# Patient Record
Sex: FEMALE | NOT HISPANIC OR LATINO | Employment: OTHER | URBAN - METROPOLITAN AREA
[De-identification: names, ages, dates, MRNs, and addresses within clinical notes are randomized per-mention and may not be internally consistent; named-entity substitution may affect disease eponyms.]

---

## 2017-01-26 ENCOUNTER — TELEPHONE (OUTPATIENT)
Dept: BONE DENSITY | Facility: CLINIC | Age: 67
End: 2017-01-26

## 2017-03-03 ENCOUNTER — TELEPHONE (OUTPATIENT)
Dept: FAMILY MEDICINE | Facility: CLINIC | Age: 67
End: 2017-03-03

## 2017-03-03 NOTE — TELEPHONE ENCOUNTER
Summary:    Patient is due/failing the following:   BP CHECK and COLONOSCOPY    Type of outreach:  Phone, left message for patient to call back.   Action needed: none    If need for provider review:    Please indicate OV, lab, MTM, or nurse appt if needed.  Indicate fasting or not fasting.                                                                                                                                          Ramses Recio ma

## 2017-03-10 ENCOUNTER — TELEPHONE (OUTPATIENT)
Dept: FAMILY MEDICINE | Facility: CLINIC | Age: 67
End: 2017-03-10

## 2017-03-10 NOTE — TELEPHONE ENCOUNTER
Reason for Call:  Other call back    Detailed comments: pt. Returned call from Ramses Hoang call.    Phone Number Patient can be reached at: Home number on file [phone] (home)    Best Time: any time    Can we leave a detailed message on this number? YES    Call taken on 3/10/2017 at 2:31 PM by Cydney Aquino  .

## 2017-05-22 ENCOUNTER — TELEPHONE (OUTPATIENT)
Dept: FAMILY MEDICINE | Facility: CLINIC | Age: 67
End: 2017-05-22

## 2017-05-22 NOTE — TELEPHONE ENCOUNTER
Left VM for patient with # for [facility]/Medical Records  Dora CARDENAS RN

## 2017-05-22 NOTE — TELEPHONE ENCOUNTER
Reason for Call:  Other     Detailed comments: Patient wants to schedule a DEXA scan with [facility] and they need her last scan done in 2004.  Patient said Dr Gallagher faxed after her December visit.  Please refax [phone]   Or mail out an TRE so we can send it   Do not see an TRE on record or that this was ever sent.    Phone Number Patient can be reached at: Home number on file [phone] (home)    Best Time: anytime      Can we leave a detailed message on this number? YES    Call taken on 5/22/2017 at 12:38 PM by Estelita Gar

## 2017-05-26 ENCOUNTER — TELEPHONE (OUTPATIENT)
Dept: FAMILY MEDICINE | Facility: CLINIC | Age: 67
End: 2017-05-26

## 2017-05-26 NOTE — TELEPHONE ENCOUNTER
Reason for Call:  Other call back    Detailed comments:   Pt calling and irritated about not getting a dexa scan and them not getting her records  She said when she calls [facility] it's just an answering machine and no one ever calls back.  I asked her if Sean has tried requesting her records, she said she would try calling them again.  Pt was very short and irritated about this.     Phone Number Patient can be reached at: Home number on file [phone] (home)    Best Time: anytime    Can we leave a detailed message on this number? YES    Call taken on 5/26/2017 at 2:18 PM by Thalia Blue

## 2017-05-26 NOTE — TELEPHONE ENCOUNTER
Reason for Call: Request for an order : Bone Density at [facility]    Order or referral being requested: Bone Density test    Date needed: within two weeks    Has the patient been seen by the PCP for this problem? YES    Additional comments: Ernestina from [facility] called on behalf of patient to get bone density order    Phone number Patient can be reached at:  Other phone number:  [facility]  fax      Best Time:  anytime    Can we leave a detailed message on this number?  NO    Call taken on 5/26/2017 at 3:53 PM by Jeffrey Zhao

## 2017-05-31 ENCOUNTER — MYC MEDICAL ADVICE (OUTPATIENT)
Dept: FAMILY MEDICINE | Facility: CLINIC | Age: 67
End: 2017-05-31

## 2017-06-02 ENCOUNTER — TELEPHONE (OUTPATIENT)
Dept: FAMILY MEDICINE | Facility: CLINIC | Age: 67
End: 2017-06-02

## 2017-06-02 DIAGNOSIS — Z13.820 SCREENING FOR OSTEOPOROSIS: Primary | ICD-10-CM

## 2017-06-02 DIAGNOSIS — Z78.0 ASYMPTOMATIC MENOPAUSAL STATE: ICD-10-CM

## 2017-06-02 NOTE — TELEPHONE ENCOUNTER
Reason for Call: Request for an order or referral:    Order or referral being requested: correct order for Bone Density test what was sent to them was not correct they need a order that say bone density test and reason for the test. Fax order to [phone]    Date needed: as soon as possible    Has the patient been seen by the PCP for this problem? YES    Additional comments:     Phone number Patient can be reached at:  Other phone number:  [phone]    Best Time:      Can we leave a detailed message on this number?  YES    Call taken on 6/2/2017 at 8:44 AM by Miranda Green

## 2017-06-02 NOTE — TELEPHONE ENCOUNTER
JF  Please address due to [facility] retiring.  [facility] called.  Need Bone Density Order faxed.  There is one form 12/13/16 but they state they need new one with appropriate diagnosis.  Order T' up.  Thanks, Kat Cheatham RN

## 2017-06-08 ENCOUNTER — TRANSFERRED RECORDS (OUTPATIENT)
Dept: HEALTH INFORMATION MANAGEMENT | Facility: CLINIC | Age: 67
End: 2017-06-08

## 2017-06-30 ENCOUNTER — MYC MEDICAL ADVICE (OUTPATIENT)
Dept: FAMILY MEDICINE | Facility: CLINIC | Age: 67
End: 2017-06-30

## 2017-06-30 ENCOUNTER — TRANSFERRED RECORDS (OUTPATIENT)
Dept: HEALTH INFORMATION MANAGEMENT | Facility: CLINIC | Age: 67
End: 2017-06-30

## 2017-07-03 NOTE — TELEPHONE ENCOUNTER
PN,  Please see below MyChart.  Have you seen DEXA results come through for this pt?  Former [facility] patient.  Dora CARDENAS RN

## 2017-12-13 ASSESSMENT — ACTIVITIES OF DAILY LIVING (ADL)
I_NEED_ASSISTANCE_FOR_THE_FOLLOWING_DAILY_ACTIVITIES:: NO ASSISTANCE IS NEEDED
CURRENT_FUNCTION: NO ASSISTANCE NEEDED

## 2017-12-15 ENCOUNTER — OFFICE VISIT (OUTPATIENT)
Dept: FAMILY MEDICINE | Facility: CLINIC | Age: 67
End: 2017-12-15
Payer: COMMERCIAL

## 2017-12-15 VITALS
OXYGEN SATURATION: 97 % | HEART RATE: 84 BPM | BODY MASS INDEX: 33.41 KG/M2 | WEIGHT: 200.5 LBS | DIASTOLIC BLOOD PRESSURE: 88 MMHG | HEIGHT: 65 IN | SYSTOLIC BLOOD PRESSURE: 186 MMHG | TEMPERATURE: 97.9 F

## 2017-12-15 DIAGNOSIS — R73.09 OTHER ABNORMAL GLUCOSE: ICD-10-CM

## 2017-12-15 DIAGNOSIS — Z00.00 ENCOUNTER FOR ROUTINE ADULT HEALTH EXAMINATION WITHOUT ABNORMAL FINDINGS: Primary | ICD-10-CM

## 2017-12-15 DIAGNOSIS — I10 HYPERTENSION GOAL BP (BLOOD PRESSURE) < 130/80: ICD-10-CM

## 2017-12-15 DIAGNOSIS — G47.33 OBSTRUCTIVE SLEEP APNEA: ICD-10-CM

## 2017-12-15 LAB
ALBUMIN SERPL-MCNC: 3.9 G/DL (ref 3.4–5)
ALP SERPL-CCNC: 83 U/L (ref 40–150)
ALT SERPL W P-5'-P-CCNC: 24 U/L (ref 0–50)
ANION GAP SERPL CALCULATED.3IONS-SCNC: 9 MMOL/L (ref 3–14)
AST SERPL W P-5'-P-CCNC: 22 U/L (ref 0–45)
BILIRUB SERPL-MCNC: 0.5 MG/DL (ref 0.2–1.3)
BUN SERPL-MCNC: 5 MG/DL (ref 7–30)
CALCIUM SERPL-MCNC: 9.1 MG/DL (ref 8.5–10.1)
CHLORIDE SERPL-SCNC: 107 MMOL/L (ref 94–109)
CHOLEST SERPL-MCNC: 180 MG/DL
CO2 SERPL-SCNC: 24 MMOL/L (ref 20–32)
CREAT SERPL-MCNC: 0.56 MG/DL (ref 0.52–1.04)
GFR SERPL CREATININE-BSD FRML MDRD: >90 ML/MIN/1.7M2
GLUCOSE SERPL-MCNC: 95 MG/DL (ref 70–99)
HBA1C MFR BLD: 5.3 % (ref 4.3–6)
HDLC SERPL-MCNC: 76 MG/DL
LDLC SERPL CALC-MCNC: 85 MG/DL
NONHDLC SERPL-MCNC: 104 MG/DL
POTASSIUM SERPL-SCNC: 3.8 MMOL/L (ref 3.4–5.3)
PROT SERPL-MCNC: 7.5 G/DL (ref 6.8–8.8)
SODIUM SERPL-SCNC: 140 MMOL/L (ref 133–144)
TRIGL SERPL-MCNC: 94 MG/DL

## 2017-12-15 PROCEDURE — 80061 LIPID PANEL: CPT | Performed by: FAMILY MEDICINE

## 2017-12-15 PROCEDURE — G0438 PPPS, INITIAL VISIT: HCPCS | Performed by: FAMILY MEDICINE

## 2017-12-15 PROCEDURE — 83036 HEMOGLOBIN GLYCOSYLATED A1C: CPT | Performed by: FAMILY MEDICINE

## 2017-12-15 PROCEDURE — 36415 COLL VENOUS BLD VENIPUNCTURE: CPT | Performed by: FAMILY MEDICINE

## 2017-12-15 PROCEDURE — 80053 COMPREHEN METABOLIC PANEL: CPT | Performed by: FAMILY MEDICINE

## 2017-12-15 ASSESSMENT — ACTIVITIES OF DAILY LIVING (ADL): CURRENT_FUNCTION: NO ASSISTANCE NEEDED

## 2017-12-15 NOTE — NURSING NOTE
"Chief Complaint   Patient presents with     Physical     /88  Pulse 84  Temp 97.9  F (36.6  C) (Tympanic)  Ht 5' 5\" (1.651 m)  Wt 200 lb 8 oz (90.9 kg)  SpO2 97%  BMI 33.36 kg/m2 Estimated body mass index is 33.36 kg/(m^2) as calculated from the following:    Height as of this encounter: 5' 5\" (1.651 m).    Weight as of this encounter: 200 lb 8 oz (90.9 kg).  Medication Reconciliation: complete      Health Maintenance due pending provider review:  Mammogram    DECLINE--Pt declines to schedule mammo, REASON: does not do them  ACT completed    Jennifer Baker CMA  "

## 2017-12-15 NOTE — MR AVS SNAPSHOT
After Visit Summary   12/15/2017    Luz Krueger    MRN: [id number]           Patient Information     Date Of Birth          1950        Visit Information        Provider Department      12/15/2017 9:30 AM Kristi Smith DO [facility]        Today's Diagnoses     Encounter for routine adult health examination without abnormal findings    -  1    Hypertension goal BP (blood pressure) < 130/80        Obstructive sleep apnea        Other abnormal glucose          Care Instructions      Preventive Health Recommendations    Female Ages 65 +    Yearly exam:     See your health care provider every year in order to  o Review health changes.   o Discuss preventive care.    o Review your medicines if your doctor has prescribed any.      You no longer need a yearly Pap test unless you've had an abnormal Pap test in the past 10 years. If you have vaginal symptoms, such as bleeding or discharge, be sure to talk with your provider about a Pap test.      Every 1 to 2 years, have a mammogram.  If you are over 69, talk with your health care provider about whether or not you want to continue having screening mammograms.      Every 10 years, have a colonoscopy. Or, have a yearly FIT test (stool test). These exams will check for colon cancer.       Have a cholesterol test every 5 years, or more often if your doctor advises it.       Have a diabetes test (fasting glucose) every three years. If you are at risk for diabetes, you should have this test more often.       At age 65, have a bone density scan (DEXA) to check for osteoporosis (brittle bone disease).    Shots:    Get a flu shot each year.    Get a tetanus shot every 10 years.    Talk to your doctor about your pneumonia vaccines. There are now two you should receive - Pneumovax (PPSV 23) and Prevnar (PCV 13).    Talk to your doctor about the shingles vaccine.    Talk to your doctor about the hepatitis B vaccine.    Nutrition:     Eat at least 5  servings of fruits and vegetables each day.      Eat whole-grain bread, whole-wheat pasta and brown rice instead of white grains and rice.      Talk to your provider about Calcium and Vitamin D.     Lifestyle    Exercise at least 150 minutes a week (30 minutes a day, 5 days a week). This will help you control your weight and prevent disease.      Limit alcohol to one drink per day.      No smoking.       Wear sunscreen to prevent skin cancer.       See your dentist twice a year for an exam and cleaning.      See your eye doctor every 1 to 2 years to screen for conditions such as glaucoma, macular degeneration and cataracts.          Follow-ups after your visit        Future tests that were ordered for you today     Open Future Orders        Priority Expected Expires Ordered    Fecal colorectal cancer screen (FIT) Routine 1/5/2018 3/9/2018 12/15/2017            Who to contact     If you have questions or need follow up information about today's clinic visit or your schedule please contact [facility] directly at [phone].  Normal or non-critical lab and imaging results will be communicated to you by [employer], letter or phone within 4 business days after the clinic has received the results. If you do not hear from us within 7 days, please contact the clinic through [employer] or phone. If you have a critical or abnormal lab result, we will notify you by phone as soon as possible.  Submit refill requests through [employer] or call your pharmacy and they will forward the refill request to us. Please allow 3 business days for your refill to be completed.          Additional Information About Your Visit        [employer] Information     [employer] gives you secure access to your electronic health record. If you see a primary care provider, you can also send messages to your care team and make appointments. If you have questions, please call your primary care clinic.  If you do not have a primary care provider, please  "call [phone] and they will assist you.        Care EveryWhere ID     This is your Care EveryWhere ID. This could be used by other organizations to access your [place] medical records  [id number]        Your Vitals Were     Pulse Temperature Height Pulse Oximetry BMI (Body Mass Index)       84 97.9  F (36.6  C) (Tympanic) 5' 5\" (1.651 m) 97% 33.36 kg/m2        Blood Pressure from Last 3 Encounters:   12/15/17 186/88   12/13/16 180/90   07/27/15 148/72    Weight from Last 3 Encounters:   12/15/17 200 lb 8 oz (90.9 kg)   12/13/16 199 lb 14.4 oz (90.7 kg)   07/27/15 237 lb (107.5 kg)              We Performed the Following     Comprehensive metabolic panel (BMP + Alb, Alk Phos, ALT, AST, Total. Bili, TP)     Hemoglobin A1c     Lipid panel reflex to direct LDL Fasting        Primary Care Provider Office Phone # Fax #    Kristi Smith [phone] [address] [address]        Equal Access to Services     [facility]: Hadii aad ku hadasho Soomaali, waaxda luqadaha, qaybta kaalmada adeegyada, parish sewelln bill ronquillo . So [facility] [phone].    ATENCIÓN: Si habla español, tiene a lewis disposición servicios gratuitos de asistencia lingüística. Llame al [phone].    We comply with applicable federal civil rights laws and [place] laws. We do not discriminate on the basis of race, color, national origin, age, disability, sex, sexual orientation, or gender identity.            Thank you!     Thank you for choosing [facility]  for your care. Our goal is always to provide you with excellent care. Hearing back from our patients is one way we can continue to improve our services. Please take a few minutes to complete the written survey that you may receive in the mail after your visit with us. Thank you!             Your Updated Medication List - Protect others around you: Learn how to safely use, store and throw away your medicines at " www.disposemymeds.org.          This list is accurate as of: 12/15/17 10:38 AM.  Always use your most recent med list.                   Brand Name Dispense Instructions for use Diagnosis    blood glucose monitoring test strip    no brand specified    1 Box    by In Vitro route daily.    Other abnormal blood chemistry       FLOVENT  MCG/ACT Inhaler   Generic drug:  fluticasone           GLUCOMETER ELITE CLASSIC Kit     1 kit    1 Units 2 times daily as needed.    Other abnormal blood chemistry       Q-10 CO-ENZYME PO      2 TABLETS DAILY

## 2017-12-15 NOTE — LETTER
December 15, 2017      Luz Krueger  [address] [place] [address] [place] [address]        To Whom It May Concern,      Luz Krueger is under my professional medical care.  Please allow her to use medical flex spending for chiropractor visits.          Sincerely,        Kristi Smith, DO

## 2017-12-15 NOTE — LETTER
December 15, 2017      uLz Krueger  [address]  [place] [address]        To Whom It May Concern,        THIS NOTE IS TO DOCUMENT THAT MEDICALLY YOU WOULD BENEFIT FROM REGULAR MASSAGE THERAPY.              Sincerely,        Kristi Smith, DO

## 2017-12-15 NOTE — PROGRESS NOTES
Dear Luz,   Your test results are all back -   -All of your labs are normal.  Let us know if you have any questions.  -Kristi Smith, DO

## 2017-12-15 NOTE — PROGRESS NOTES
SUBJECTIVE:   Luz Krueger is a 67 year old female who presents for Preventive Visit.      Are you in the first 12 months of your Medicare coverage?  No    Physical   Annual:     Getting at least 3 servings of Calcium per day::  Yes    Bi-annual eye exam::  Yes    Dental care twice a year::  Yes    Sleep apnea or symptoms of sleep apnea::  Sleep apnea    Diet::  Vegetarian/vegan and Gluten-free/reduced    Frequency of exercise::  6-7 days/week    Duration of exercise::  45-60 minutes    Taking medications regularly::  Yes    Medication side effects::  Not applicable    Additional concerns today::  YES    Ability to successfully perform activities of daily living: no assistance needed  Home Safety:  Throw rugs in the hallway  Hearing Impairment: no hearing concerns        Fall risk:         COGNITIVE SCREEN  1) Repeat 3 items (Banana, Sunrise, Chair)    2) Clock draw: NORMAL  3) 3 item recall: Recalls 3 objects  Results: 3 items recalled: COGNITIVE IMPAIRMENT LESS LIKELY    Mini-CogTM Copyright S Zoey. Licensed by the author for use in [facility]; reprinted with permission ([email or web address]@[email or web address]). All rights reserved.          Reviewed and updated as needed this visit by clinical staffTobacco  Allergies  Meds  Med Hx  Surg Hx  Fam Hx  Soc Hx        Reviewed and updated as needed this visit by Provider        Social History   Substance Use Topics     Smoking status: Former Smoker     Packs/day: 1.00     Smokeless tobacco: Never Used     Alcohol use 0.0 oz/week     0 Standard drinks or equivalent per week      Comment: rarely       Alcohol Use 12/13/2017   If you drink alcohol, do you typically have greater than 3 drinks per day OR greater than 7 drinks per week?   Not applicable   No flowsheet data found.            -------------------------------------    Today's PHQ-2 Score: PHQ-2 ( 1999 Pfizer) 12/13/2017   Q1: Little interest or pleasure in doing things 0   Q2: Feeling down, depressed or hopeless 0    PHQ-2 Score 0   Q1: Little interest or pleasure in doing things Not at all   Q2: Feeling down, depressed or hopeless Not at all   PHQ-2 Score 0       Do you feel safe in your environment - NO    Do you have a Health Care Directive?: No: Advance care planning was reviewed with patient; patient declined at this time.      Current providers sharing in care for this patient include: Patient Care Team:  Kristi Smith DO as PCP - General (Family Practice)    The following health maintenance items are reviewed in Epic and correct as of today:  Health Maintenance   Topic Date Due     ADVANCE DIRECTIVE PLANNING Q5 YRS  06/01/2004     PNEUMOCOCCAL (2 of 2 - PCV13) 07/29/2015     ASTHMA CONTROL TEST Q6 MOS  01/27/2016     PHQ-9 Q6 MONTHS  01/27/2016     FALL RISK ASSESSMENT  07/27/2016     MAMMO SCREEN Q2 YR (SYSTEM ASSIGNED)  07/22/2017     INFLUENZA VACCINE (SYSTEM ASSIGNED)  09/01/2017     ASTHMA ACTION PLAN Q1 YR  12/13/2017     DEPRESSION ACTION PLAN Q1 YR  12/13/2017     COLONOSCOPY Q3 YR  03/10/2020     LIPID SCREEN Q5 YR FEMALE (SYSTEM ASSIGNED)  12/13/2021     TETANUS IMMUNIZATION (SYSTEM ASSIGNED)  12/13/2022     DEXA SCAN SCREENING (SYSTEM ASSIGNED)  Completed     HEPATITIS C SCREENING  Completed     Patient Active Problem List   Diagnosis     Genital herpes     Mild persistent asthma     Obstructive sleep apnea     Abnormal blood chemistry     Obesity     Dysthymia     Fatty liver     HYPERLIPIDEMIA LDL GOAL <130     Hypertension goal BP (blood pressure) < 130/80     Essential and other specified forms of tremor     Fear of travel with panic attacks     Past Surgical History:   Procedure Laterality Date     C NONSPECIFIC PROCEDURE  07/1990    s/p Cholecystectomy     C NONSPECIFIC PROCEDURE  2000 and 2003    Root Canals     CHOLECYSTECTOMY         Social History   Substance Use Topics     Smoking status: Former Smoker     Packs/day: 1.00     Smokeless tobacco: Never Used     Alcohol use 0.0 oz/week     0  "Standard drinks or equivalent per week      Comment: rarely     Family History   Problem Relation Age of Onset     Adopted: Yes     Cancer - colorectal Maternal Grandmother      age 56     CANCER Mother      Lung Cancer     Aneurysm Maternal Uncle              Pneumonia Vaccine:pt declined the PCV 13  Mammogram Screening: pt declined the mammogram   Review of Systems  Constitutional, HEENT, cardiovascular, pulmonary, GI, , musculoskeletal, neuro, skin, endocrine and psych systems are negative, except as otherwise noted.      OBJECTIVE:   There were no vitals taken for this visit. Estimated body mass index is 33.27 kg/(m^2) as calculated from the following:    Height as of 12/13/16: 5' 5\" (1.651 m).    Weight as of 12/13/16: 199 lb 14.4 oz (90.7 kg).  Physical Exam  GENERAL APPEARANCE: alert, no distress and obese  EYES: Eyes grossly normal to inspection, PERRL and conjunctivae and sclerae normal  HENT: ear canals and TM's normal, nose and mouth without ulcers or lesions, oropharynx clear and oral mucous membranes moist  NECK: no adenopathy, no asymmetry, masses, or scars and thyroid normal to palpation  RESP: lungs clear to auscultation - no rales, rhonchi or wheezes  BREAST: normal without masses, tenderness or nipple discharge and no palpable axillary masses or adenopathy  CV: regular rate and rhythm, normal S1 S2, no S3 or S4, no murmur, click or rub, no peripheral edema and peripheral pulses strong  ABDOMEN: soft, nontender, no hepatosplenomegaly, no masses and bowel sounds normal  MS: no musculoskeletal defects are noted and gait is age appropriate without ataxia  SKIN: no suspicious lesions or rashes  NEURO: Normal strength and tone, sensory exam grossly normal, mentation intact and speech normal  PSYCH: mentation appears normal and affect normal/bright    ASSESSMENT / PLAN:   1. Encounter for routine adult health examination without abnormal findings  Pt recently weaned herself off all her medications  She " "is not taking anything and no longer wants to do any screening test  She refused mammograms  Decline colonoscopy and said she would \"know\" when she needs another.  Did discuss checking FIT test and look for blood - will send home with patient  - Lipid panel reflex to direct LDL Fasting  - Fecal colorectal cancer screen (FIT); Future    2. Hypertension goal BP (blood pressure) < 130/80  BP is very high today and recheck is similar.  She will check BP at home however states she will not RTC if high because the numbers do not have any validity.    - Lipid panel reflex to direct LDL Fasting  - Comprehensive metabolic panel (BMP + Alb, Alk Phos, ALT, AST, Total. Bili, TP)    3. Obstructive sleep apnea  Using CPAP but also trying wean off    4. Other abnormal glucose  Pending labs  - Hemoglobin A1c    End of Life Planning:  Patient currently has an advanced directive: Yes.  Practitioner is supportive of decision.    COUNSELING:  Reviewed preventive health counseling, as reflected in patient instructions        Estimated body mass index is 33.27 kg/(m^2) as calculated from the following:    Height as of 12/13/16: 5' 5\" (1.651 m).    Weight as of 12/13/16: 199 lb 14.4 oz (90.7 kg).  Weight management plan: Discussed healthy diet and exercise guidelines and patient will follow up in 12 months in clinic to re-evaluate.   reports that she has quit smoking. She smoked 1.00 pack per day. She has never used smokeless tobacco.   However patient does smell of tobacco today -   Quit in the past and tried to discuss lung cancer screening with CT - she declined        Appropriate preventive services were discussed with this patient, including applicable screening as appropriate for cardiovascular disease, diabetes, osteopenia/osteoporosis, and glaucoma.  As appropriate for age/gender, discussed screening for colorectal cancer, prostate cancer, breast cancer, and cervical cancer. Checklist reviewing preventive services available has " been given to the patient.    Reviewed patients plan of care and provided an AVS. The Basic Care Plan (routine screening as documented in Health Maintenance) for Luz meets the Care Plan requirement. This Care Plan has been established and reviewed with the Patient.    Counseling Resources:  ATP IV Guidelines  Pooled Cohorts Equation Calculator  Breast Cancer Risk Calculator  FRAX Risk Assessment  ICSI Preventive Guidelines  Dietary Guidelines for Americans, 2010  USDA's MyPlate  ASA Prophylaxis  Lung CA Screening    Kristi Smith,   [facility]

## 2017-12-16 ASSESSMENT — ASTHMA QUESTIONNAIRES: ACT_TOTALSCORE: 25

## 2017-12-18 PROCEDURE — G0328 FECAL BLOOD SCRN IMMUNOASSAY: HCPCS | Performed by: FAMILY MEDICINE

## 2017-12-19 DIAGNOSIS — Z00.00 ENCOUNTER FOR ROUTINE ADULT HEALTH EXAMINATION WITHOUT ABNORMAL FINDINGS: ICD-10-CM

## 2017-12-19 LAB — HEMOCCULT STL QL IA: NEGATIVE

## 2017-12-19 NOTE — PROGRESS NOTES
Dear Luz,   Your test results are all back -   -FIT test (screening test for colon cancer) was normal. ADVISE - rechecking in 1 year.  Let us know if you have any questions.  -Kristi Smith, DO

## 2018-11-14 ENCOUNTER — TELEPHONE (OUTPATIENT)
Dept: FAMILY MEDICINE | Facility: CLINIC | Age: 68
End: 2018-11-14

## 2018-11-14 DIAGNOSIS — Z12.11 SCREEN FOR COLON CANCER: Primary | ICD-10-CM

## 2018-11-14 NOTE — TELEPHONE ENCOUNTER
Reason for Call:  Colonoscopy order     Detailed comments:   Pt told Dr. Smith that she would get back to her if she needed to complete a colonoscopy.  She is confirming that she indeed does.  Please put order in for pt.  She [facility].      Phone Number Patient can be reached at: Home number on file [phone] (home)    Best Time: ANy     Can we leave a detailed message on this number? YES    Call taken on 11/14/2018 at 11:18 AM by Nazia Ferguson

## 2018-11-14 NOTE — TELEPHONE ENCOUNTER
PN  Please see below message  Pt said she would like to have the same doctor perform procedure - Dr. Mcneill. Last done on 12/31/2013. Pathology negative     Pended new order.   Please approve if appropriate  Karishma Aquino RN

## 2018-11-21 ENCOUNTER — HOSPITAL ENCOUNTER (OUTPATIENT)
Facility: CLINIC | Age: 68
End: 2018-11-21
Attending: SPECIALIST | Admitting: SPECIALIST
Payer: MEDICARE

## 2019-05-15 ENCOUNTER — OFFICE VISIT (OUTPATIENT)
Dept: FAMILY MEDICINE | Facility: CLINIC | Age: 69
End: 2019-05-15
Payer: MEDICARE

## 2019-05-15 VITALS
BODY MASS INDEX: 31.77 KG/M2 | OXYGEN SATURATION: 97 % | HEART RATE: 69 BPM | WEIGHT: 190.7 LBS | TEMPERATURE: 97.3 F | HEIGHT: 65 IN | DIASTOLIC BLOOD PRESSURE: 94 MMHG | SYSTOLIC BLOOD PRESSURE: 160 MMHG

## 2019-05-15 DIAGNOSIS — Z00.00 ENCOUNTER FOR ROUTINE ADULT HEALTH EXAMINATION WITHOUT ABNORMAL FINDINGS: Primary | ICD-10-CM

## 2019-05-15 DIAGNOSIS — R73.09 ELEVATED GLUCOSE: ICD-10-CM

## 2019-05-15 DIAGNOSIS — I10 HYPERTENSION GOAL BP (BLOOD PRESSURE) < 130/80: ICD-10-CM

## 2019-05-15 DIAGNOSIS — K57.32 DIVERTICULITIS OF COLON: ICD-10-CM

## 2019-05-15 DIAGNOSIS — F17.200 TOBACCO USE DISORDER: ICD-10-CM

## 2019-05-15 DIAGNOSIS — M79.2 NERVE PAIN: ICD-10-CM

## 2019-05-15 DIAGNOSIS — E78.5 HYPERLIPIDEMIA LDL GOAL <130: ICD-10-CM

## 2019-05-15 DIAGNOSIS — Z12.11 SCREEN FOR COLON CANCER: ICD-10-CM

## 2019-05-15 DIAGNOSIS — K76.0 FATTY LIVER: ICD-10-CM

## 2019-05-15 LAB
ERYTHROCYTE [DISTWIDTH] IN BLOOD BY AUTOMATED COUNT: 14 % (ref 10–15)
HBA1C MFR BLD: 5.4 % (ref 0–5.6)
HCT VFR BLD AUTO: 41.1 % (ref 35–47)
HGB BLD-MCNC: 13.5 G/DL (ref 11.7–15.7)
MCH RBC QN AUTO: 31.3 PG (ref 26.5–33)
MCHC RBC AUTO-ENTMCNC: 32.8 G/DL (ref 31.5–36.5)
MCV RBC AUTO: 95 FL (ref 78–100)
PLATELET # BLD AUTO: 284 10E9/L (ref 150–450)
RBC # BLD AUTO: 4.31 10E12/L (ref 3.8–5.2)
VIT B12 SERPL-MCNC: 885 PG/ML (ref 193–986)
WBC # BLD AUTO: 6.4 10E9/L (ref 4–11)

## 2019-05-15 PROCEDURE — 80061 LIPID PANEL: CPT | Performed by: FAMILY MEDICINE

## 2019-05-15 PROCEDURE — 99214 OFFICE O/P EST MOD 30 MIN: CPT | Mod: 25 | Performed by: FAMILY MEDICINE

## 2019-05-15 PROCEDURE — 82607 VITAMIN B-12: CPT | Performed by: FAMILY MEDICINE

## 2019-05-15 PROCEDURE — 85027 COMPLETE CBC AUTOMATED: CPT | Performed by: FAMILY MEDICINE

## 2019-05-15 PROCEDURE — 84443 ASSAY THYROID STIM HORMONE: CPT | Performed by: FAMILY MEDICINE

## 2019-05-15 PROCEDURE — 36415 COLL VENOUS BLD VENIPUNCTURE: CPT | Performed by: FAMILY MEDICINE

## 2019-05-15 PROCEDURE — G0439 PPPS, SUBSEQ VISIT: HCPCS | Performed by: FAMILY MEDICINE

## 2019-05-15 PROCEDURE — 80053 COMPREHEN METABOLIC PANEL: CPT | Performed by: FAMILY MEDICINE

## 2019-05-15 PROCEDURE — 83036 HEMOGLOBIN GLYCOSYLATED A1C: CPT | Performed by: FAMILY MEDICINE

## 2019-05-15 ASSESSMENT — PATIENT HEALTH QUESTIONNAIRE - PHQ9: SUM OF ALL RESPONSES TO PHQ QUESTIONS 1-9: 4

## 2019-05-15 ASSESSMENT — MIFFLIN-ST. JEOR: SCORE: 1386.92

## 2019-05-15 ASSESSMENT — ACTIVITIES OF DAILY LIVING (ADL): CURRENT_FUNCTION: NO ASSISTANCE NEEDED

## 2019-05-15 NOTE — NURSING NOTE
"Chief Complaint   Patient presents with     Medicare Visit     BP (!) 160/94   Pulse 69   Temp 97.3  F (36.3  C) (Oral)   Ht 1.645 m (5' 4.75\")   Wt 86.5 kg (190 lb 11.2 oz)   SpO2 97%   BMI 31.98 kg/m   Estimated body mass index is 31.98 kg/m  as calculated from the following:    Height as of this encounter: 1.645 m (5' 4.75\").    Weight as of this encounter: 86.5 kg (190 lb 11.2 oz).  Medication Reconciliation: complete      Health Maintenance that is potentially due pending provider review:  Mammogram, Pap Smear and ACT    Pt declines to have Mammogram.  Completing forms today.    MADDIE Elliott  "

## 2019-05-15 NOTE — PROGRESS NOTES
"SUBJECTIVE:   Luz Krueger is a 68 year old female who presents for Preventive Visit.    Are you in the first 12 months of your Medicare coverage?  No    Healthy Habits:     In general, how would you rate your overall health?  Good    Frequency of exercise:  6-7 days/week    Duration of exercise:  30-45 minutes    Do you usually eat at least 4 servings of fruit and vegetables a day, include whole grains    & fiber and avoid regularly eating high fat or \"junk\" foods?  Yes    Taking medications regularly:  Not Applicable    Medication side effects:  Not applicable    Ability to successfully perform activities of daily living:  No assistance needed    Home Safety:  No safety concerns identified    Hearing Impairment:  No hearing concerns    In the past 6 months, have you been bothered by leaking of urine? Yes    In general, how would you rate your overall mental or emotional health?  Good      PHQ-2 Total Score: 1    Additional concerns today:  Yes    Do you feel safe in your environment? Yes    Do you have a Health Care Directive? Yes: Patient states has Advance Directive and will bring in a copy to clinic.      Fall risk  Fallen 2 or more times in the past year?: No  Any fall with injury in the past year?: No    Cognitive Screening   1) Repeat 3 items (Leader, Season, Table)    2) Clock draw: NORMAL  3) 3 item recall: Recalls 2 objects   Results: NORMAL clock, 1-2 items recalled: COGNITIVE IMPAIRMENT LESS LIKELY    Mini-CogTM Copyright CRISS Greer. Licensed by the author for use in [facility]; reprinted with permission ([email or web address]@.[email or web address]). All rights reserved.      Do you have sleep apnea, excessive snoring or daytime drowsiness?: yes, sleep apnea     Reviewed and updated as needed this visit by clinical staff  Tobacco  Allergies  Meds         Reviewed and updated as needed this visit by Provider        Social History     Tobacco Use     Smoking status: Former Smoker     Packs/day: 1.00     Years: 5.00     " Pack years: 5.00     Types: Cigarettes     Start date: 1988     Last attempt to quit: 1993     Years since quittin.9     Smokeless tobacco: Never Used   Substance Use Topics     Alcohol use: Yes     Alcohol/week: 0.0 oz     Comment: rarely         Alcohol Use 5/15/2019   Prescreen: >3 drinks/day or >7 drinks/week? No   Prescreen: >3 drinks/day or >7 drinks/week? -   No flowsheet data found.        PROBLEMS TO ADD ON...  Pt is here for her physical and to check in -   She has weaned herself off all of her medications - she is using supplements to detox her heavy metals - multiple supplements with variety of different side effects.  She declines any medications at this time    Pt is having some nerve pain that goes into her hands and feet  She has had some urge incontinence    Abdominal pain - started few weeks ago - left sided area  Was hospitalized in  at [facility] for transverse colon diveritulitis  She feel like this may be similar  Pain is better now but persisting.      Current providers sharing in care for this patient include:   Patient Care Team:  Kristi Smith DO as PCP - General (Family Practice)  Kristi Smith DO as Assigned PCP    The following health maintenance items are reviewed in Epic and correct as of today:  Health Maintenance   Topic Date Due     ZOSTER IMMUNIZATION (1 of 2) 1999     ADVANCE DIRECTIVE PLANNING Q5 YRS  2004     PNEUMOCOCCAL IMMUNIZATION 65+ LOW/MEDIUM RISK (2 of 2 - PCV13) 2015     PHQ-9 Q6 MONTHS  2016     MAMMO SCREEN Q2 YR (SYSTEM ASSIGNED)  2017     ASTHMA ACTION PLAN Q1 YR  2017     ASTHMA CONTROL TEST Q6 MOS  06/15/2018     MEDICARE ANNUAL WELLNESS VISIT  12/15/2018     FALL RISK ASSESSMENT  12/15/2018     FIT Q1 YR  2018     INFLUENZA VACCINE (Season Ended) 2019     COLONOSCOPY Q3 YR  03/10/2020     DTAP/TDAP/TD IMMUNIZATION (3 - Td) 2022     LIPID SCREEN Q5 YR FEMALE (SYSTEM ASSIGNED)  12/15/2022      "DEXA SCAN SCREENING (SYSTEM ASSIGNED)  Completed     DEPRESSION ACTION PLAN  Completed     HEPATITIS C SCREENING  Completed     IPV IMMUNIZATION  Aged Out     MENINGITIS IMMUNIZATION  Aged Out     Patient Active Problem List   Diagnosis     Genital herpes     Mild persistent asthma     Obstructive sleep apnea     Abnormal blood chemistry     Obesity     Dysthymia     Fatty liver     HYPERLIPIDEMIA LDL GOAL <130     Hypertension goal BP (blood pressure) < 130/80     Essential and other specified forms of tremor     Fear of travel with panic attacks     Diverticulitis of colon     Past Surgical History:   Procedure Laterality Date     C NONSPECIFIC PROCEDURE  1990    s/p Cholecystectomy     C NONSPECIFIC PROCEDURE   and     Root Canals     CHOLECYSTECTOMY       COLONOSCOPY  various    beign Polpys     ORTHOPEDIC SURGERY  1963    Removal of excess bone right foot       Social History     Tobacco Use     Smoking status: Former Smoker     Packs/day: 1.00     Years: 5.00     Pack years: 5.00     Types: Cigarettes     Start date: 1988     Last attempt to quit: 1993     Years since quittin.9     Smokeless tobacco: Never Used   Substance Use Topics     Alcohol use: Yes     Alcohol/week: 0.0 oz     Comment: rarely     Family History   Adopted: Yes   Problem Relation Age of Onset     Cancer Mother         Lung Cancer     Cancer - colorectal Maternal Grandmother         age 56     Aneurysm Maternal Uncle      Unknown/Adopted No family hx of          Mammogram Screening: declined    Review of Systems  Constitutional, HEENT, cardiovascular, pulmonary, GI, , musculoskeletal, neuro, skin, endocrine and psych systems are negative, except as otherwise noted.    OBJECTIVE:   BP (!) 160/94   Pulse 69   Temp 97.3  F (36.3  C) (Oral)   Ht 1.645 m (5' 4.75\")   Wt 86.5 kg (190 lb 11.2 oz)   SpO2 97%   BMI 31.98 kg/m   Estimated body mass index is 31.98 kg/m  as calculated from the following:    Height as of " "this encounter: 1.645 m (5' 4.75\").    Weight as of this encounter: 86.5 kg (190 lb 11.2 oz).  Physical Exam  GENERAL APPEARANCE: alert, no distress and obese  EYES: Eyes grossly normal to inspection, PERRL and conjunctivae and sclerae normal  HENT: ear canals and TM's normal, nose and mouth without ulcers or lesions, oropharynx clear and oral mucous membranes moist  NECK: no adenopathy, no asymmetry, masses, or scars and thyroid normal to palpation  RESP: slight wheeze in the left posterior lung -   BREAST: normal without masses, tenderness or nipple discharge and no palpable axillary masses or adenopathy  CV: regular rate and rhythm, normal S1 S2, no S3 or S4, no murmur, click or rub, no peripheral edema and peripheral pulses strong  ABDOMEN: soft with left mid abdominal tenderness but r/g/r  MS: no musculoskeletal defects are noted and gait is age appropriate without ataxia  SKIN: no suspicious lesions or rashes  NEURO: Normal strength and tone, sensory exam grossly normal, mentation intact and speech normal  PSYCH: mentation appears normal and affect normal/bright    Diagnostic Test Results:  Results for orders placed or performed in visit on 05/15/19 (from the past 24 hour(s))   Hemoglobin A1c   Result Value Ref Range    Hemoglobin A1C 5.4 0 - 5.6 %   CBC with platelets   Result Value Ref Range    WBC 6.4 4.0 - 11.0 10e9/L    RBC Count 4.31 3.8 - 5.2 10e12/L    Hemoglobin 13.5 11.7 - 15.7 g/dL    Hematocrit 41.1 35.0 - 47.0 %    MCV 95 78 - 100 fl    MCH 31.3 26.5 - 33.0 pg    MCHC 32.8 31.5 - 36.5 g/dL    RDW 14.0 10.0 - 15.0 %    Platelet Count 284 150 - 450 10e9/L       ASSESSMENT / PLAN:   1. Encounter for routine adult health examination without abnormal findings  Routine screening  Pap no longer needed  Patient declines mammograms  She agrees to FIT test       2. Diverticulitis of colon  Hx of diverticulitis and now left abdominal pain  Recommended CT scan however patient declined.  Discussed risk for " "possible rupture and need for emergency surgery.  She will think about getting imaging  Of note she does have contrast dye allergy  - CBC with platelets    3. Hyperlipidemia LDL goal <130  Pt fasting -   Declines medication  - TSH with free T4 reflex  - Lipid panel reflex to direct LDL Fasting    4. Nerve pain  Will r/o b12 or thyroid as underlying cause  - Vitamin B12  - TSH with free T4 reflex    5. Hypertension goal BP (blood pressure) < 130/80  BP is not <140/90 however patient declined meds - encouraged but she declined  - TSH with free T4 reflex  - Comprehensive metabolic panel (BMP + Alb, Alk Phos, ALT, AST, Total. Bili, TP)    6. Fatty liver  Checking labs    7. Screen for colon cancer     - Fecal colorectal cancer screen (FIT); Future    8. Elevated glucose     - Hemoglobin A1c    9. Tobacco use disorder  Had bad reaction to chantix in the past  Recommended keep working on cessation      End of Life Planning:  Patient currently has an advanced directive: paperwork completed today    COUNSELING:  Reviewed preventive health counseling, as reflected in patient instructions    Estimated body mass index is 31.98 kg/m  as calculated from the following:    Height as of this encounter: 1.645 m (5' 4.75\").    Weight as of this encounter: 86.5 kg (190 lb 11.2 oz).    Weight management plan: Discussed healthy diet and exercise guidelines     reports that she quit smoking about 25 years ago. Her smoking use included cigarettes. She started smoking about 30 years ago. She has a 5.00 pack-year smoking history. She has never used smokeless tobacco.  Tobacco Cessation Action Plan: Information offered: Patient not interested at this time    Appropriate preventive services were discussed with this patient, including applicable screening as appropriate for cardiovascular disease, diabetes, osteopenia/osteoporosis, and glaucoma.  As appropriate for age/gender, discussed screening for colorectal cancer, prostate cancer, breast " cancer, and cervical cancer. Checklist reviewing preventive services available has been given to the patient.    Reviewed patients plan of care and provided an AVS. The Basic Care Plan (routine screening as documented in Health Maintenance) for Luz meets the Care Plan requirement. This Care Plan has been established and reviewed with the Patient.    Counseling Resources:  ATP IV Guidelines  Pooled Cohorts Equation Calculator  Breast Cancer Risk Calculator  FRAX Risk Assessment  ICSI Preventive Guidelines  Dietary Guidelines for Americans, 2010  USDA's MyPlate  ASA Prophylaxis  Lung CA Screening    Kristi Smith DO  [facility]    Identified Health Risks:

## 2019-05-16 LAB
ALBUMIN SERPL-MCNC: 4.4 G/DL (ref 3.4–5)
ALP SERPL-CCNC: 83 U/L (ref 40–150)
ALT SERPL W P-5'-P-CCNC: 33 U/L (ref 0–50)
ANION GAP SERPL CALCULATED.3IONS-SCNC: 9 MMOL/L (ref 3–14)
AST SERPL W P-5'-P-CCNC: 22 U/L (ref 0–45)
BILIRUB SERPL-MCNC: 0.5 MG/DL (ref 0.2–1.3)
BUN SERPL-MCNC: 7 MG/DL (ref 7–30)
CALCIUM SERPL-MCNC: 9.1 MG/DL (ref 8.5–10.1)
CHLORIDE SERPL-SCNC: 107 MMOL/L (ref 94–109)
CHOLEST SERPL-MCNC: 197 MG/DL
CO2 SERPL-SCNC: 26 MMOL/L (ref 20–32)
CREAT SERPL-MCNC: 0.59 MG/DL (ref 0.52–1.04)
GFR SERPL CREATININE-BSD FRML MDRD: >90 ML/MIN/{1.73_M2}
GLUCOSE SERPL-MCNC: 80 MG/DL (ref 70–99)
HDLC SERPL-MCNC: 93 MG/DL
LDLC SERPL CALC-MCNC: 90 MG/DL
NONHDLC SERPL-MCNC: 104 MG/DL
POTASSIUM SERPL-SCNC: 3.6 MMOL/L (ref 3.4–5.3)
PROT SERPL-MCNC: 7.9 G/DL (ref 6.8–8.8)
SODIUM SERPL-SCNC: 142 MMOL/L (ref 133–144)
TRIGL SERPL-MCNC: 69 MG/DL
TSH SERPL DL<=0.005 MIU/L-ACNC: 2.15 MU/L (ref 0.4–4)

## 2019-05-16 ASSESSMENT — ASTHMA QUESTIONNAIRES: ACT_TOTALSCORE: 25

## 2019-05-17 NOTE — RESULT ENCOUNTER NOTE
Dear Luz,   Your test results are all back -   -All of your labs are normal.  They look GREAT!  Keep up the good work.  Let us know if you have any questions.  -Kristi Smith, DO

## 2019-05-19 PROCEDURE — 82274 ASSAY TEST FOR BLOOD FECAL: CPT | Performed by: FAMILY MEDICINE

## 2019-05-25 LAB — HEMOCCULT STL QL IA: NEGATIVE

## 2019-05-28 DIAGNOSIS — Z12.11 SCREEN FOR COLON CANCER: ICD-10-CM

## 2019-05-28 NOTE — RESULT ENCOUNTER NOTE
Dear Luz,   Your test results are all back -   -FIT test (screening test for colon cancer) was normal. ADVISE: rechecking this test in 1 year.  Let us know if you have any questions.  -Kristi Smith, DO

## 2019-06-25 ENCOUNTER — DOCUMENTATION ONLY (OUTPATIENT)
Dept: OTHER | Facility: CLINIC | Age: 69
End: 2019-06-25

## 2019-09-30 ENCOUNTER — HEALTH MAINTENANCE LETTER (OUTPATIENT)
Age: 69
End: 2019-09-30

## 2019-11-12 ENCOUNTER — TELEPHONE (OUTPATIENT)
Dept: FAMILY MEDICINE | Facility: CLINIC | Age: 69
End: 2019-11-12

## 2019-11-12 DIAGNOSIS — F34.1 DYSTHYMIA: Primary | ICD-10-CM

## 2019-11-12 NOTE — TELEPHONE ENCOUNTER
Dr Smith patient is calling to get a referral to [facility] psychologist in [place]. Patient has been seeing her since Jan. For psycho-therapy.Patient just found that if she had a referral from her PCP Medicare would pay. Tried to call patient to find out what she was being seen for and she said look in my chart. I did not see anything. Please advise. Thanks    Carmelina Pelayo  Referral Coordinator

## 2020-02-26 ENCOUNTER — ANCILLARY PROCEDURE (OUTPATIENT)
Dept: GENERAL RADIOLOGY | Facility: CLINIC | Age: 70
End: 2020-02-26
Attending: FAMILY MEDICINE
Payer: MEDICARE

## 2020-02-26 ENCOUNTER — OFFICE VISIT (OUTPATIENT)
Dept: FAMILY MEDICINE | Facility: CLINIC | Age: 70
End: 2020-02-26
Payer: MEDICARE

## 2020-02-26 VITALS
HEART RATE: 81 BPM | BODY MASS INDEX: 33.41 KG/M2 | HEIGHT: 65 IN | DIASTOLIC BLOOD PRESSURE: 75 MMHG | WEIGHT: 200.56 LBS | RESPIRATION RATE: 16 BRPM | OXYGEN SATURATION: 97 % | SYSTOLIC BLOOD PRESSURE: 189 MMHG

## 2020-02-26 DIAGNOSIS — M67.449 DIGITAL MUCINOUS CYST OF FINGER: ICD-10-CM

## 2020-02-26 DIAGNOSIS — R10.32 LEFT LOWER QUADRANT PAIN: Primary | ICD-10-CM

## 2020-02-26 DIAGNOSIS — R10.32 LEFT LOWER QUADRANT PAIN: ICD-10-CM

## 2020-02-26 DIAGNOSIS — K58.0 IRRITABLE BOWEL SYNDROME WITH DIARRHEA: ICD-10-CM

## 2020-02-26 DIAGNOSIS — Z12.11 SCREEN FOR COLON CANCER: ICD-10-CM

## 2020-02-26 PROCEDURE — 99214 OFFICE O/P EST MOD 30 MIN: CPT | Performed by: FAMILY MEDICINE

## 2020-02-26 PROCEDURE — 74019 RADEX ABDOMEN 2 VIEWS: CPT | Mod: FY

## 2020-02-26 ASSESSMENT — MIFFLIN-ST. JEOR: SCORE: 1426.66

## 2020-02-26 ASSESSMENT — PAIN SCALES - GENERAL: PAINLEVEL: NO PAIN (0)

## 2020-02-26 NOTE — NURSING NOTE
"Chief Complaint   Patient presents with     Gastrointestinal Problem     BP (!) 189/75 (BP Location: Right arm, Patient Position: Sitting, Cuff Size: Adult Large)   Pulse 81   Resp 16   Ht 1.645 m (5' 4.75\")   Wt 91 kg (200 lb 9 oz)   LMP  (Exact Date)   SpO2 97%   Breastfeeding No   BMI 33.63 kg/m   Estimated body mass index is 33.63 kg/m  as calculated from the following:    Height as of this encounter: 1.645 m (5' 4.75\").    Weight as of this encounter: 91 kg (200 lb 9 oz).  bp completed using cuff size: large      Health Maintenance addressed:  NONE    N/a  Adalgisa Mas CMA on 2/26/2020 at 11:58 AM                "

## 2020-02-26 NOTE — PROGRESS NOTES
Subjective     Luz Krueger is a 69 year old female who presents to clinic today for the following health issues:    HPI   Patient is here today discuss Gi issues that have been a on going issue     Spastic colon -   Diagnosed in early     GI symptoms -   Still having left lower quadrant abdominal pain   Also has pain in the left upper quadrant when bending over  Brothers -- inlaw  with small intestine blockage  BM - kept records of BM since July   More often some constipation than diarrhea  She is on many supplements including a probiotic -        Pt also has had right pinky finger distal joint swelling  She also has some swelling and trouble bending the left middle finger    Pt is on multiple supplements -   Taking some for GI including aloe vera with probiotic in AM  Magnesium with calcium BID  She does have hx of diverticulitis in the past   Pt has agreed to FIT test for colon cancer screening in the past - has not had colonoscopy           -------------------------------------    Patient Active Problem List   Diagnosis     Genital herpes     Mild persistent asthma     Obstructive sleep apnea     Abnormal blood chemistry     Obesity     Dysthymia     Fatty liver     HYPERLIPIDEMIA LDL GOAL <130     Hypertension goal BP (blood pressure) < 130/80     Essential and other specified forms of tremor     Fear of travel with panic attacks     Diverticulitis of colon     Irritable bowel syndrome with diarrhea     Past Surgical History:   Procedure Laterality Date     C NONSPECIFIC PROCEDURE  1990    s/p Cholecystectomy     C NONSPECIFIC PROCEDURE   and     Root Canals     CHOLECYSTECTOMY       COLONOSCOPY  various    beign Polpys     ORTHOPEDIC SURGERY  1963    Removal of excess bone right foot       Social History     Tobacco Use     Smoking status: Former Smoker     Packs/day: 1.00     Years: 5.00     Pack years: 5.00     Types: Cigarettes     Start date: 1988     Last attempt to quit: 1993  "    Years since quittin.7     Smokeless tobacco: Never Used   Substance Use Topics     Alcohol use: Yes     Alcohol/week: 0.0 standard drinks     Comment: rarely     Family History   Adopted: Yes   Problem Relation Age of Onset     Cancer Mother         Lung Cancer     Cancer - colorectal Maternal Grandmother         age 56     Aneurysm Maternal Uncle      Unknown/Adopted No family hx of            -------------------------------------  Reviewed and updated as needed this visit by Provider  Tobacco  Allergies  Meds  Problems  Med Hx  Surg Hx  Fam Hx         Review of Systems   ROS COMP: Constitutional, HEENT, cardiovascular, pulmonary, GI, , musculoskeletal, neuro, skin, endocrine and psych systems are negative, except as otherwise noted.      Objective    BP (!) 189/75 (BP Location: Right arm, Patient Position: Sitting, Cuff Size: Adult Large)   Pulse 81   Resp 16   Ht 1.645 m (5' 4.75\")   Wt 91 kg (200 lb 9 oz)   LMP  (Exact Date)   SpO2 97%   Breastfeeding No   BMI 33.63 kg/m    Body mass index is 33.63 kg/m .  Physical Exam   GENERAL: alert and no distress  ABDOMEN: BS present -   Soft with some epigastric tenderness but no g/r/r -     Diagnostic Test Results:  Labs reviewed in Epic  Abdominal xray - moderate about of stool with gas        Assessment & Plan     1. Irritable bowel syndrome with diarrhea   see below    2. Left lower quadrant pain  Suspect this is due to constipation   Discussed techniques to try to alleviate constipation - pt has GI supplement that she stopped but has used in past with success.  Also recommended Miralex   If not improving in next few weeks reach out by petr for CT [facility] abd[facility]     - XR Abdomen 2 Views; Future    3. Digital mucinous cyst of finger  Referral to orthopedics   - Orthopedic & Spine  Referral; Future    4. Screen for colon cancer     - Fecal colorectal cancer screen (FIT); Future         I spent over 25 minutes with " face-to-face patient and over 50% time in counseling regarding above issues.     Return in about 3 months (around 5/26/2020) for Physical Exam.    Kristi Smith,   [facility]

## 2020-03-25 DIAGNOSIS — Z12.11 SCREEN FOR COLON CANCER: ICD-10-CM

## 2020-03-25 PROCEDURE — 82274 ASSAY TEST FOR BLOOD FECAL: CPT | Performed by: FAMILY MEDICINE

## 2020-03-27 LAB — HEMOCCULT STL QL IA: NEGATIVE

## 2020-04-24 ENCOUNTER — TELEPHONE (OUTPATIENT)
Dept: FAMILY MEDICINE | Facility: CLINIC | Age: 70
End: 2020-04-24

## 2020-04-24 NOTE — TELEPHONE ENCOUNTER
Not on current medication list.  Last Rx 2015 - medication history list  Last physical 5/1/5/19    [employer]ufact[employer] Inventory message sent to patient.  Will need a video/telephone visit.    Kat Cheatham RN

## 2020-04-24 NOTE — TELEPHONE ENCOUNTER
Reason for Call:  Medication or medication refill:    Do you use a [facility]?  Name of the pharmacy and phone number for the current request:       [id number] - [place], [place] - [address]        Name of the medication requested: acyclovir (ZOVIRAX) 400 MG table      Other request:     Can we leave a detailed message on this number? YES    Phone number patient can be reached at: Home number on file [phone] (home)    Best Time: any    Call taken on 4/24/2020 at 2:47 PM by Carola Livingston

## 2020-04-27 NOTE — TELEPHONE ENCOUNTER
Patient called back.  Will schedule virtual visit with PN.  Seen 2/26/20 for IBS  Last seen 5/15/19 - physical    Kat Cheatham RN

## 2020-04-28 ENCOUNTER — VIRTUAL VISIT (OUTPATIENT)
Dept: FAMILY MEDICINE | Facility: CLINIC | Age: 70
End: 2020-04-28
Payer: MEDICARE

## 2020-04-28 DIAGNOSIS — A60.04 HERPES SIMPLEX VULVOVAGINITIS: Primary | ICD-10-CM

## 2020-04-28 DIAGNOSIS — F43.0 ACUTE REACTION TO STRESS: ICD-10-CM

## 2020-04-28 DIAGNOSIS — I10 HYPERTENSION GOAL BP (BLOOD PRESSURE) < 130/80: ICD-10-CM

## 2020-04-28 DIAGNOSIS — F41.9 ANXIETY: ICD-10-CM

## 2020-04-28 PROCEDURE — 99214 OFFICE O/P EST MOD 30 MIN: CPT | Mod: GT | Performed by: FAMILY MEDICINE

## 2020-04-28 PROCEDURE — 96127 BRIEF EMOTIONAL/BEHAV ASSMT: CPT | Performed by: FAMILY MEDICINE

## 2020-04-28 RX ORDER — ACYCLOVIR 400 MG/1
5 TABLET ORAL EVERY 24 HOURS
COMMUNITY
Start: 2010-10-28

## 2020-04-28 RX ORDER — ACYCLOVIR 400 MG/1
400 TABLET ORAL EVERY 8 HOURS
Qty: 15 TABLET | Refills: 0 | Status: SHIPPED | OUTPATIENT
Start: 2020-04-28 | End: 2020-04-28

## 2020-04-28 RX ORDER — ACYCLOVIR 400 MG/1
400 TABLET ORAL EVERY 8 HOURS
Qty: 45 TABLET | Refills: 0 | Status: SHIPPED | OUTPATIENT
Start: 2020-04-28 | End: 2021-05-25

## 2020-04-28 ASSESSMENT — PATIENT HEALTH QUESTIONNAIRE - PHQ9: SUM OF ALL RESPONSES TO PHQ QUESTIONS 1-9: 6

## 2020-04-28 NOTE — PROGRESS NOTES
"Luz Krueger is a 69 year old female who is being evaluated via a billable video visit.      The patient has been notified of following:     \"This video visit will be conducted via a call between you and your physician/provider. We have found that certain health care needs can be provided without the need for an in-person physical exam.  This service lets us provide the care you need with a video conversation.  If a prescription is necessary we can send it directly to your pharmacy.  If lab work is needed we can place an order for that and you can then stop by our lab to have the test done at a later time.    Video visits are billed at different rates depending on your insurance coverage.  Please reach out to your insurance provider with any questions.    If during the course of the call the physician/provider feels a video visit is not appropriate, you will not be charged for this service.\"    Patient has given verbal consent for Video visit? Yes    How would you like to obtain your AVS? Aileen    Patient would like the video invitation sent by: Text to cell phone: [phone]    Will anyone else be joining your video visit? No      Subjective     Luz Krueger is a 69 year old female who presents to clinic today for the following health issues:    HPI  Medication Followup of  Acyclovir 400MG tablet    Taking Medication as prescribed: yes    Side Effects:  None    Medication Helping Symptoms:  yes     Uses it for genital area herpes outbreak, tingling/itching.  Current sx's started 1 1/2 wk ago.  Still going on now, still itching/tingling.  Gets outbreaks   First in 1986, and at first, was getting one monthly.  Used to get triggered by periods.  In last few yrs, hasn't had one for ~5 yrs.  Used to take it for ~5 days.      More stressed lately due to not being able to see her therapist, and getting massages.  Thinks that is why she got the herpes outbreak now.  Off meds for mood since ~2013.      BP elevated " in past...  She takes natural txt, and BPs have come down.  'Down' to 163/83 - she is fairly happy with this level.      Patient Active Problem List   Diagnosis     Genital herpes     Mild persistent asthma     Obstructive sleep apnea     Abnormal blood chemistry     Obesity     Dysthymia     Fatty liver     HYPERLIPIDEMIA LDL GOAL <130     Hypertension goal BP (blood pressure) < 130/80     Essential and other specified forms of tremor     Fear of travel with panic attacks     Diverticulitis of colon     Irritable bowel syndrome with diarrhea     Past Surgical History:   Procedure Laterality Date     C NONSPECIFIC PROCEDURE  1990    s/p Cholecystectomy     C NONSPECIFIC PROCEDURE   and     Root Canals     CHOLECYSTECTOMY       COLONOSCOPY  various    beign Polpys     ORTHOPEDIC SURGERY  1963    Removal of excess bone right foot       Social History     Tobacco Use     Smoking status: Former Smoker     Packs/day: 1.00     Years: 5.00     Pack years: 5.00     Types: Cigarettes     Start date: 1988     Last attempt to quit: 1993     Years since quittin.9     Smokeless tobacco: Never Used   Substance Use Topics     Alcohol use: Yes     Alcohol/week: 0.0 standard drinks     Comment: rarely     Family History   Adopted: Yes   Problem Relation Age of Onset     Cancer Mother         Lung Cancer     Cancer - colorectal Maternal Grandmother         age 56     Aneurysm Maternal Uncle      Unknown/Adopted No family hx of          Current Outpatient Medications   Medication Sig Dispense Refill     acyclovir (ZOVIRAX) 400 MG tablet Take 5 tablets by mouth every 24 hours       acyclovir (ZOVIRAX) 400 MG tablet Take 1 tablet (400 mg) by mouth every 8 hours for 5 days (take at onset of symptoms) 45 tablet 0     Q-10 CO-ENZYME PO 2 TABLETS DAILY       Allergies   Allergen Reactions     Contrast Dye      Sulfa Drugs Hives     Recent Labs   Lab Test 05/15/19  1331 12/15/17  1018 16  1417   A1C 5.4 5.3 5.3  "  LDL 90 85 108*   HDL 93 76 67   TRIG 69 94 94   ALT 33 24 23   CR 0.59 0.56  --    GFRESTIMATED >90 >90  --    GFRESTBLACK >90 >90  --    POTASSIUM 3.6 3.8  --    TSH 2.15  --   --       BP Readings from Last 3 Encounters:   02/26/20 (!) 189/75   05/15/19 (!) 160/94   12/15/17 186/88    Wt Readings from Last 3 Encounters:   02/26/20 91 kg (200 lb 9 oz)   05/15/19 86.5 kg (190 lb 11.2 oz)   12/15/17 90.9 kg (200 lb 8 oz)               Reviewed and updated as needed this visit by Provider  Tobacco  Allergies  Meds  Problems  Med Hx  Surg Hx  Fam Hx         Review of Systems   ROS COMP: Constitutional, HEENT, cardiovascular, pulmonary, gi and gu systems are negative, except as otherwise noted.      Objective    There were no vitals taken for this visit.  Estimated body mass index is 33.63 kg/m  as calculated from the following:    Height as of 2/26/20: 1.645 m (5' 4.75\").    Weight as of 2/26/20: 91 kg (200 lb 9 oz).  Physical Exam   GEN: pleasant, alert, oriented, nad   Resp: normal respiratory rate, able to speak in full sentences  Psych: full range affect, normal speech and grooming, judgement and insight intact       Diagnostic Test Results:  Labs reviewed in Epic        Assessment & Plan       ICD-10-CM    1. Herpes simplex vulvovaginitis  A60.04 acyclovir (ZOVIRAX) 400 MG tablet     DISCONTINUED: acyclovir (ZOVIRAX) 400 MG tablet   2. Anxiety  F41.9 EMOTIONAL / BEHAVIORAL ASSESSMENT   3. Acute reaction to stress  F43.0    4. Hypertension goal BP (blood pressure) < 130/80  I10      Herpes - pt having first episode in ~5 yrs.  Sx's now for 1 1/2 wks, so will send in acyclovir to start now, but discussed may not be as effective as if she starts it right away next time.  Will send in enough for 3 full courses, so she will have some at home to use if they come more often now with more stressors.  Risks and benefits of medication(s) including potential side effects reviewed with patient.  Questions answered. "     Anxiety/acute stress/HTN- Pt with more stressors now with COVID, credit card issues, inability to see her therapist and massage therapist.  Feels this is why she got her first herpes outbreak in a few yrs now.  Offered mental health therapy referral- she'd like to wait for now, but may discuss with Dr. Smith.  She thought she still had a virtual appt with Dr. Smith on 5/5/20, but discussed it looks like it was cancelled.  Discussed Dr. Smith looks to have appt availabilities on 5/6 and 5/8 still.  Pt will look and try to schedule.        Return in about 1 week (around 5/5/2020) for Routine Visit/Chronic Cares/Med Check with Dr. Smith.    Sabine Trivedi MD  [facility]      Phone visit- 17 minutes via [employer] call.    Unable to get connected via [employer] or [employer] video call.

## 2020-04-29 ASSESSMENT — ASTHMA QUESTIONNAIRES: ACT_TOTALSCORE: 25

## 2020-07-01 ENCOUNTER — MYC MEDICAL ADVICE (OUTPATIENT)
Dept: FAMILY MEDICINE | Facility: CLINIC | Age: 70
End: 2020-07-01

## 2020-07-01 NOTE — TELEPHONE ENCOUNTER
Patient Quality Outreach      Summary:    Patient is due/failing the following:   Breast Cancer Screening - Mammogram    Type of outreach:    Sent [employer] message.    Questions for provider review:    None                                                                                   **Start Working phrase here:**       Patient has the following on her problem list/HM: None                                                Dora CARDENAS RN

## 2020-07-03 NOTE — TELEPHONE ENCOUNTER
PN,   See Aileen from pt   She does not do mammograms  Do you want to discontinue this on HM so we don't continue to contact her yearly?  Dora CARDENAS RN

## 2020-08-07 ENCOUNTER — PATIENT OUTREACH (OUTPATIENT)
Dept: CARE COORDINATION | Facility: CLINIC | Age: 70
End: 2020-08-07

## 2020-08-07 DIAGNOSIS — Z76.89 HEALTH CARE HOME: Primary | ICD-10-CM

## 2020-08-07 NOTE — PROGRESS NOTES
Clinic Care Coordination Contact  [facility]/Voicemail       Clinical Data: Care Coordinator Outreach  Outreach attempted x 1.  Left message on patient's voicemail with call back information and requested return call.  Plan:Care Coordinator will do no further outreaches at this time.     CHW received a call from a Coordination form Radha Nicolletjosé miguel Alvarez Pranav ph number [phone]  and she would like us to reach out to this patient she needs help with getting some medical equipment and cost of medications, patient lives alone and does have some vision issues and still drives.       CHW sent a message for the CC RN to place a referral in.

## 2020-08-10 NOTE — PROGRESS NOTES
Clinic Care Coordination Contact  [facility]/Voicemail       Clinical Data: Care Coordinator Outreach  Outreach attempted x 2.  Left message on patient's voicemail with call back information and requested return call.  Plan:  Care Coordinator will try to reach patient again in 3-5 business days.

## 2020-08-13 NOTE — PROGRESS NOTES
Clinic Care Coordination Contact   Patient called to inform the CHW that she doesnt need help from CCC but will call if things change.

## 2020-10-29 ENCOUNTER — VIRTUAL VISIT (OUTPATIENT)
Dept: FAMILY MEDICINE | Facility: CLINIC | Age: 70
End: 2020-10-29
Payer: MEDICARE

## 2020-10-29 DIAGNOSIS — R26.81 UNSTEADINESS: ICD-10-CM

## 2020-10-29 DIAGNOSIS — R42 VERTIGO: Primary | ICD-10-CM

## 2020-10-29 PROCEDURE — G2012 BRIEF CHECK IN BY MD/QHP: HCPCS | Mod: 95 | Performed by: PHYSICIAN ASSISTANT

## 2020-10-29 NOTE — Clinical Note
Would you be able to help Luz schedule MRI tomorrow at [facility].  Open pretty much all day.

## 2020-10-29 NOTE — PROGRESS NOTES
"Luz Krueger is a 70 year old female who is being evaluated via a billable telephone visit.      The patient has been notified of following:     \"This telephone visit will be conducted via a call between you and your physician/provider. We have found that certain health care needs can be provided without the need for a physical exam.  This service lets us provide the care you need with a short phone conversation.  If a prescription is necessary we can send it directly to your pharmacy.  If lab work is needed we can place an order for that and you can then stop by our lab to have the test done at a later time.    Telephone visits are billed at different rates depending on your insurance coverage. During this emergency period, for some insurers they may be billed the same as an in-person visit.  Please reach out to your insurance provider with any questions.    If during the course of the call the physician/provider feels a telephone visit is not appropriate, you will not be charged for this service.\"    Patient has given verbal consent for Telephone visit?  Yes    What phone number would you like to be contacted at?     How would you like to obtain your AVS? Aileen    Subjective     Luz Krueger is a 70 year old female who presents via phone visit today for the following health issues:    HPI          She was sitting in her chair and she turned her head to the right, she had severe dizziness.  Happened on Monday, but seemed to improve.  Tuesday was getting worse and lasting longer.  Went to urgent care and did some physical exam, but was told that things was down so they could not get any images.  Thought to be positional vertigo.  She did follow up with PHYSICAL THERAPY today who did have concern for more going on.    I was able to talk to PHYSICAL THERAPY, and she mentioned a positive vestibular occular reflex on the right.  We discussed possibility of central cause of dizziness.        Review of Systems " "  Constitutional, HEENT, cardiovascular, pulmonary, gi and gu systems are negative, except as otherwise noted.       Objective          Vitals:  No vitals were obtained today due to virtual visit.    alert and no distress  PSYCH: Alert and oriented times 3; coherent speech, normal   rate and volume, able to articulate logical thoughts, able   to abstract reason, no tangential thoughts, no hallucinations   or delusions  Her affect is normal  RESP: No cough, no audible wheezing, able to talk in full sentences  Remainder of exam unable to be completed due to telephone visits    No results found for this or any previous visit (from the past 24 hour(s)).        Assessment/Plan:    Assessment & Plan     Vertigo  Will order MR based on recent exam findings from PHYSICAL THERAPY.    - MR Brain w/o Contrast; Future    Unsteadiness    - MR Brain w/o Contrast; Future     BMI:   Estimated body mass index is 33.63 kg/m  as calculated from the following:    Height as of 2/26/20: 1.645 m (5' 4.75\").    Weight as of 2/26/20: 91 kg (200 lb 9 oz).                No follow-ups on file.    Hugo Aguillon PA-C  [facility]    Phone call duration:  8 minutes                "

## 2020-10-30 ENCOUNTER — TELEPHONE (OUTPATIENT)
Dept: FAMILY MEDICINE | Facility: CLINIC | Age: 70
End: 2020-10-30

## 2020-10-30 ENCOUNTER — HOSPITAL ENCOUNTER (OUTPATIENT)
Dept: MRI IMAGING | Facility: CLINIC | Age: 70
Discharge: HOME | End: 2020-10-30
Attending: PHYSICIAN ASSISTANT | Admitting: PHYSICIAN ASSISTANT
Payer: MEDICARE

## 2020-10-30 DIAGNOSIS — R26.81 UNSTEADINESS: ICD-10-CM

## 2020-10-30 DIAGNOSIS — R42 VERTIGO: ICD-10-CM

## 2020-10-30 PROCEDURE — 70551 MRI BRAIN STEM W/O DYE: CPT

## 2020-10-30 NOTE — TELEPHONE ENCOUNTER
Agree MRI - age related changes but otherwise normal  No - not able to see crystals in the inner ear if that is cause of dizziness  Yes - can schedule virtual visit - 30min preferred  PN

## 2020-10-30 NOTE — TELEPHONE ENCOUNTER
Pn,    Informed pt of MRI results.  She is wanting to know if there was any brain cancer if we would see it. I told her yes.    Also wanting to now if the MRI picked up the crystals causing her dizziness.    She is wanting to know she can schedule her her apt with you as a virtual visit?    Please advise.  Thanks,  Lisbeth Amaya RN      Triage-- please send a [employer] message to pt.

## 2020-10-30 NOTE — RESULT ENCOUNTER NOTE
Please call with results.    Overall stable looking MRI.  No acute finding such as stroke.  Would have her follow up with PCP if symptoms persist or worsen     José Miguel Aguillon PA-C

## 2020-11-02 ENCOUNTER — MYC MEDICAL ADVICE (OUTPATIENT)
Dept: FAMILY MEDICINE | Facility: CLINIC | Age: 70
End: 2020-11-02

## 2020-11-02 DIAGNOSIS — R42 DIZZINESS: Primary | ICD-10-CM

## 2020-11-09 NOTE — TELEPHONE ENCOUNTER
PN,  Please see below.  I offered apt with you but pt must have canceled in the meantime and sent this message directly to me (I haven't been here since last Monday).    Looks like pt is requesting a referral to :    Rehabilitation services at [facility] of the [facility]. Their number to call is [phone]. My Symptoms:  problems concentrating, Headaches, anxiety, & depression; along with the acute episode of dizziness & vertigo On 10/25/2020-current.    Not sure how to pend this.  Please advise.  Thanks,  Lisbeth Amaya RN

## 2020-11-10 NOTE — TELEPHONE ENCOUNTER
Placed order for PT - vestibular rehab at  - can elect to go to [facility]    Let me know if she needs anything else.  PN

## 2020-11-13 ENCOUNTER — HOSPITAL ENCOUNTER (OUTPATIENT)
Dept: PHYSICAL THERAPY | Facility: CLINIC | Age: 70
Setting detail: THERAPIES SERIES
End: 2020-11-13
Attending: FAMILY MEDICINE
Payer: MEDICARE

## 2020-11-13 ENCOUNTER — TELEPHONE (OUTPATIENT)
Dept: FAMILY MEDICINE | Facility: CLINIC | Age: 70
End: 2020-11-13

## 2020-11-13 DIAGNOSIS — R42 DIZZINESS: ICD-10-CM

## 2020-11-13 NOTE — PROGRESS NOTES
Luz presented to PT for a vestibular evaluation related to her dizziness. During subjective questions, pt reports that she is already seeing a physical therapist for her dizziness. Discussed that in order to see her she would need to be discharged from other PT facility as this would a duplication of services. Luz appeared upset, believed this clinic would also assess for food allergies and diet changes as well as have ENTs on site. Explained the services this clinic could offer. Pt decided to continue to follow up with current PT. Evaluation cancelled.

## 2020-11-13 NOTE — TELEPHONE ENCOUNTER
Reason for Call:  Same Day Appointment, Requested Provider:  Kristi Smith MD    PCP: Kristi Smith    Reason for visit: reschedule of missed appointment from 11/13/2020- would prefer in-person visit as she has several symptoms and issues she would like to discuss with Dr Smith    Duration of symptoms: ongoing- several months    Have you been treated for this in the past? Yes    Additional comments: any    Can we leave a detailed message on this number? YES    Phone number patient can be reached at: Home number on file [phone] (home)    Best Time: any    Call taken on 11/13/2020 at 3:02 PM by Sho Merritt

## 2020-11-16 NOTE — TELEPHONE ENCOUNTER
VM left asking patient to call back.    Is scheduled to see PN on 12/4 - this time ok or does she want to be seen sooner.  Friday 11/20 put 3:00 on hold with PN in case wants to be seen then.  Kat Cheatham RN

## 2020-11-20 ENCOUNTER — OFFICE VISIT (OUTPATIENT)
Dept: FAMILY MEDICINE | Facility: CLINIC | Age: 70
End: 2020-11-20
Payer: MEDICARE

## 2020-11-20 VITALS
WEIGHT: 203.9 LBS | OXYGEN SATURATION: 99 % | TEMPERATURE: 98.4 F | HEIGHT: 65 IN | BODY MASS INDEX: 33.97 KG/M2 | DIASTOLIC BLOOD PRESSURE: 91 MMHG | RESPIRATION RATE: 16 BRPM | HEART RATE: 116 BPM | SYSTOLIC BLOOD PRESSURE: 172 MMHG

## 2020-11-20 DIAGNOSIS — I10 ESSENTIAL HYPERTENSION: Primary | ICD-10-CM

## 2020-11-20 DIAGNOSIS — R42 VERTIGO: ICD-10-CM

## 2020-11-20 DIAGNOSIS — Z12.11 SCREEN FOR COLON CANCER: ICD-10-CM

## 2020-11-20 DIAGNOSIS — F34.1 DYSTHYMIA: ICD-10-CM

## 2020-11-20 LAB
ERYTHROCYTE [DISTWIDTH] IN BLOOD BY AUTOMATED COUNT: 14.1 % (ref 10–15)
ERYTHROCYTE [SEDIMENTATION RATE] IN BLOOD BY WESTERGREN METHOD: 51 MM/H (ref 0–30)
HCT VFR BLD AUTO: 42.4 % (ref 35–47)
HGB BLD-MCNC: 14 G/DL (ref 11.7–15.7)
MCH RBC QN AUTO: 30.9 PG (ref 26.5–33)
MCHC RBC AUTO-ENTMCNC: 33 G/DL (ref 31.5–36.5)
MCV RBC AUTO: 94 FL (ref 78–100)
PLATELET # BLD AUTO: 269 10E9/L (ref 150–450)
RBC # BLD AUTO: 4.53 10E12/L (ref 3.8–5.2)
WBC # BLD AUTO: 7.3 10E9/L (ref 4–11)

## 2020-11-20 PROCEDURE — 85652 RBC SED RATE AUTOMATED: CPT | Performed by: FAMILY MEDICINE

## 2020-11-20 PROCEDURE — 84443 ASSAY THYROID STIM HORMONE: CPT | Performed by: FAMILY MEDICINE

## 2020-11-20 PROCEDURE — 85027 COMPLETE CBC AUTOMATED: CPT | Performed by: FAMILY MEDICINE

## 2020-11-20 PROCEDURE — 36415 COLL VENOUS BLD VENIPUNCTURE: CPT | Performed by: FAMILY MEDICINE

## 2020-11-20 PROCEDURE — 99214 OFFICE O/P EST MOD 30 MIN: CPT | Performed by: FAMILY MEDICINE

## 2020-11-20 RX ORDER — AMLODIPINE BESYLATE 2.5 MG/1
2.5 TABLET ORAL DAILY
Qty: 30 TABLET | Refills: 1 | Status: SHIPPED | OUTPATIENT
Start: 2020-11-20

## 2020-11-20 ASSESSMENT — MIFFLIN-ST. JEOR: SCORE: 1436.79

## 2020-11-20 NOTE — PROGRESS NOTES
"Subjective     Luz Krueger is a 70 year old female who presents to clinic today for the following health issues:    HPI         Dizziness  Onset/Duration: since 10/25/2020  Description:   Do you feel faint: sometimes  Does it feel like the surroundings (bed, room) are moving: YES  Unsteady/off balance: sometimes  Have you passed out or fallen: no  Intensity: moderate  Progression of Symptoms: waxing and waning  Accompanying Signs & Symptoms:  Heart palpitations or chest pain: no  Nausea, vomiting: no  Weakness or lack of coordination in arms or legs: no  Vision or speech changes: no  Numbness or tingling: YES- in fingers  Ringing in ears (Tinnitus): no  Hearing Loss: no  History:   Head trauma/concussion history: no  Previous similar symptoms: YES  Recent bleeding history: no  Any new medications (BP?): no  Precipitating factors:   Worse with activity: YES  Worse with head movement: YES  Alleviating factors:   Does staying in a fixed position give relief: YES  Therapies tried and outcome:     Dizziness   Started end of October -   Had two separate episodes    Had MRI - showed small vessel disease and atrophy     Wearing trifocals -   Saw doc for floaters and then went back for eye exam -   Needing to use magnifying glass for small letters   Progression of macular degeneration and cataracts    Now having some issues with balance -   Fell off weight scale here at the clinic  Trouble with short term memory   Went to [address] [place] [address] - had normal CBC and normal BMP ( glucose was borderline 113)      Review of Systems   Constitutional, HEENT, cardiovascular, pulmonary, GI, , musculoskeletal, neuro, skin, endocrine and psych systems are negative, except as otherwise noted.      Objective    Pulse 116   Temp 98.4  F (36.9  C) (Tympanic)   Resp 16   Ht 1.645 m (5' 4.75\")   Wt 92.5 kg (203 lb 14.4 oz)   SpO2 99%   BMI 34.19 kg/m    Body mass index is 34.19 kg/m .  Physical Exam   GENERAL: alert and no " "distress  RESP: lungs clear to auscultation - no rales, rhonchi or wheezes  CV: regular rate and rhythm, normal S1 S2, no S3 or S4, no murmur, click or rub, no peripheral edema and peripheral pulses strong  NEURO: Normal strength and tone, sensory exam grossly normal, gait abnormal: with balance issues  and rapid alternating movements normal    Results for orders placed or performed in visit on 11/20/20   ESR: Erythrocyte sedimentation rate     Status: Abnormal   Result Value Ref Range    Sed Rate 51 (H) 0 - 30 mm/h   TSH with free T4 reflex     Status: None   Result Value Ref Range    TSH 2.09 0.40 - 4.00 mU/L   CBC with platelets     Status: None   Result Value Ref Range    WBC 7.3 4.0 - 11.0 10e9/L    RBC Count 4.53 3.8 - 5.2 10e12/L    Hemoglobin 14.0 11.7 - 15.7 g/dL    Hematocrit 42.4 35.0 - 47.0 %    MCV 94 78 - 100 fl    MCH 30.9 26.5 - 33.0 pg    MCHC 33.0 31.5 - 36.5 g/dL    RDW 14.1 10.0 - 15.0 %    Platelet Count 269 150 - 450 10e9/L           Assessment & Plan     Essential hypertension  Very elevated blood pressures  Discussed diet and exercise  She will start medication to help bring down  F/u 2-3 weeks   - TSH with free T4 reflex  - amLODIPine (NORVASC) 2.5 MG tablet; Take 1 tablet (2.5 mg) by mouth daily    Vertigo  Checking labs to look for possible underlying etiology   - ESR: Erythrocyte sedimentation rate  - TSH with free T4 reflex  - CBC with platelets  - US Carotid Bilateral; Future    Dysthymia     - MENTAL HEALTH REFERRAL  - Adult; Outpatient Treatment; Individual/Couples/Family/Group Therapy/Health Psychology; [facility] [phone]; We will contact you to schedule the appointment or please call with any questions    Screen for colon cancer     - GASTROENTEROLOGY ADULT REF PROCEDURE ONLY; Future     BMI:   Estimated body mass index is 34.19 kg/m  as calculated from the following:    Height as of this encounter: 1.645 m (5' 4.75\").    Weight as of this encounter: " 92.5 kg (203 lb 14.4 oz).                No follow-ups on file.    Kristi Smith [facility]

## 2020-11-23 LAB — TSH SERPL DL<=0.005 MIU/L-ACNC: 2.09 MU/L (ref 0.4–4)

## 2020-11-25 ENCOUNTER — MYC MEDICAL ADVICE (OUTPATIENT)
Dept: FAMILY MEDICINE | Facility: CLINIC | Age: 70
End: 2020-11-25

## 2020-11-25 DIAGNOSIS — R42 VERTIGO: Primary | ICD-10-CM

## 2020-11-25 DIAGNOSIS — R70.0 ELEVATED SED RATE: ICD-10-CM

## 2020-12-01 NOTE — TELEPHONE ENCOUNTER
Called to discuss the vertigo -   Blood pressure is down a little  Drinking more water, walking and taking hawthorn berry    Dizziness is still causing some symptoms  Gait is widespread  Was having left foot swelling - due to cat scratch - prior to last Sed rate (using lavender and that has helped)  Will schedule lab only visit and get repeat Sed rate -     PN

## 2020-12-03 DIAGNOSIS — R42 VERTIGO: ICD-10-CM

## 2020-12-03 DIAGNOSIS — R70.0 ELEVATED SED RATE: ICD-10-CM

## 2020-12-03 LAB — ERYTHROCYTE [SEDIMENTATION RATE] IN BLOOD BY WESTERGREN METHOD: 68 MM/H (ref 0–30)

## 2020-12-03 PROCEDURE — 85652 RBC SED RATE AUTOMATED: CPT | Performed by: FAMILY MEDICINE

## 2020-12-03 PROCEDURE — 36415 COLL VENOUS BLD VENIPUNCTURE: CPT | Performed by: FAMILY MEDICINE

## 2020-12-09 ENCOUNTER — TELEPHONE (OUTPATIENT)
Dept: FAMILY MEDICINE | Facility: CLINIC | Age: 70
End: 2020-12-09

## 2020-12-09 NOTE — TELEPHONE ENCOUNTER
Reason for Call:  Request for results:    Name of test or procedure: ESR    Date of test of procedure: 12/03/2020    Location of the test or procedure: [facility] to leave the result message on voice mail or with a family member? YES    Phone number Patient can be reached at:  Cell number on file:    Telephone Information:   Mobile [phone]       Additional comments: Pt call and wanted someone to give her a call back about her result. Pt is concern about her result     Call taken on 12/9/2020 at 5:05 PM by Haritha Santos

## 2020-12-09 NOTE — TELEPHONE ENCOUNTER
PN  See below message, ESR was 68  Has increased since last one done on 11/20  Please advise  Thank you,  Karishma Aquino RN

## 2020-12-11 ENCOUNTER — E-VISIT (OUTPATIENT)
Dept: FAMILY MEDICINE | Facility: CLINIC | Age: 70
End: 2020-12-11
Payer: MEDICARE

## 2020-12-11 DIAGNOSIS — Z53.9 ERRONEOUS ENCOUNTER--DISREGARD: Primary | ICD-10-CM

## 2020-12-11 NOTE — TELEPHONE ENCOUNTER
Called to discuss -   Her sed rate is up - think we need office visit to discuss (can be virtual) next best steps -   Wonder about her BP and dizziness  ?inflammatory or infectious etiology   PN

## 2020-12-11 NOTE — RESULT ENCOUNTER NOTE
Dear Luz,   Your test results are all back -   I just left you a message about this high sed rate.  Something is going on in your body - inflammation or infection - causing this to go up.  I wonder if this is cause of your blood pressure going up to high or the dizziness?  Let us know if you have any questions.  -Kristi Smith, DO

## 2020-12-16 NOTE — TELEPHONE ENCOUNTER
Called and left another message   Not clear why the sed rate is going up   Not sure if infectious (cat scratches?) or if this could be inflammatory   Would like to know how BP is and if any new symptoms - asked her to send [employer] message  Will try back on Friday.  PN

## 2020-12-17 ENCOUNTER — MYC MEDICAL ADVICE (OUTPATIENT)
Dept: FAMILY MEDICINE | Facility: CLINIC | Age: 70
End: 2020-12-17

## 2020-12-18 NOTE — TELEPHONE ENCOUNTER
Called pt but no answer.  Left message to call back or check Mychart.    Also sent Mychart to pt.    Waiting a response for apt today.  Lisbeth Amaya RN

## 2020-12-18 NOTE — TELEPHONE ENCOUNTER
Lets have her schedule next Wednesday from 2-2:30    For the leg swelling -   I think we need to check that out SOONER   If she thinks she could have a blood clot we need to check an ultrasound.  Is she able to come in today to see another provider?    PN

## 2020-12-23 ENCOUNTER — VIRTUAL VISIT (OUTPATIENT)
Dept: FAMILY MEDICINE | Facility: CLINIC | Age: 70
End: 2020-12-23
Payer: MEDICARE

## 2020-12-23 DIAGNOSIS — R42 DIZZINESS: ICD-10-CM

## 2020-12-23 DIAGNOSIS — R70.0 ELEVATED SED RATE: Primary | ICD-10-CM
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
## 2020-12-23 DIAGNOSIS — I10 HYPERTENSION, UNSPECIFIED TYPE: ICD-10-CM

## 2020-12-23 PROCEDURE — 99214 OFFICE O/P EST MOD 30 MIN: CPT | Mod: 95 | Performed by: FAMILY MEDICINE

## 2020-12-23 NOTE — PROGRESS NOTES
"Luz Krueger is a 70 year old female who is being evaluated via a billable video visit.      The patient has been notified of following:     \"This video visit will be conducted via a call between you and your physician/provider. We have found that certain health care needs can be provided without the need for an in-person physical exam.  This service lets us provide the care you need with a video conversation.  If a prescription is necessary we can send it directly to your pharmacy.  If lab work is needed we can place an order for that and you can then stop by our lab to have the test done at a later time.    Video visits are billed at different rates depending on your insurance coverage.  Please reach out to your insurance provider with any questions.    If during the course of the call the physician/provider feels a video visit is not appropriate, you will not be charged for this service.\"    Patient has given verbal consent for Video visit? Yes  How would you like to obtain your AVS? MyChart  If you are dropped from the video visit, the video invite should be resent to: Text to cell phone: [phone]  Will anyone else be joining your video visit? No     Subjective     Luz Krueger is a 70 year old female who presents today via video visit for the following health issues:    HPI        Pt would also like to discuss sed rate - f/u.    Hypertension Follow-up      Do you check your blood pressure regularly outside of the clinic? Yes     Are you following a low salt diet? Yes    Are your blood pressures ever more than 140 on the top number (systolic) OR more   than 90 on the bottom number (diastolic), for example 140/90? Yes - today 173/80      How many servings of fruits and vegetables do you eat daily?  4 or more    On average, how many sweetened beverages do you drink each day (Examples: soda, juice, sweet tea, etc.  Do NOT count diet or artificially sweetened beverages)?   0    How many days per week do you " exercise enough to make your heart beat faster? 7    How many minutes a day do you exercise enough to make your heart beat faster? 30 - 60    How many days per week do you miss taking your medication? 0    Dizziness/Vertigo  Onset/Duration: ongoing, started end of October  Description:   Do you feel faint: sometimes  Does it feel like the surroundings (bed, room) are moving: YES  Unsteady/off balance: YES  Have you passed out or fallen: YES - issues with balance  Intensity: moderate  Progression of Symptoms: waxing and waning, improving   Accompanying Signs & Symptoms:  Heart palpitations or chest pain: no  Nausea, vomiting: no  Weakness or lack of coordination in arms or legs: no  Vision or speech changes: no, but forgetfulness/inattentiveness   Numbness or tingling: YES- fingers  Ringing in ears (Tinnitus): no  Hearing Loss: no  History:   Head trauma/concussion history: no  Previous similar symptoms: YES - had MRI   Recent bleeding history: no  Any new medications (BP?): no  Precipitating factors:  Worse with activity: YES-worse with walking and worse with anxiety  Worse with head movement: YES  Alleviating factors:  Does staying in a fixed position give relief: YES  Therapies tried and outcome: pt has been using wild yam cream - helps with anxiety      Pt visit to f/u on her HTN, dizziness and the elevated sed rate -     HTN -   Home readings as low as 150-160/70's  Has not started norvasc - read the side effects and was concerned about potential side effects    Got rid of cat -   Cat had scratched leg -   That helped with blood pressure  But neighbors want her to get any another and this is stressful    Tried wild yam - and that helps calm her down  Reached out to therapist -     Dizziness - gets worse when upset - sad about having to put her cat down   Headaches - since all these symptoms started she is taking more tiana berry supplement but no specific HA    Went to ER and was given IV meclizine for the  vertigo -   Had also worked on going down on cigarettes -     On her back she scratches and picks until she bleeds -        Video Start Time: 2:05pm   Trouble connecting       Review of Systems   Constitutional, HEENT, cardiovascular, pulmonary, GI, , musculoskeletal, neuro, skin, endocrine and psych systems are negative, except as otherwise noted.      Objective    Vitals - Patient Reported  Systolic (Patient Reported): (!) 173  Diastolic (Patient Reported): 80  Pulse (Patient Reported): 56      Vitals:  No vitals were obtained today due to virtual visit.    Physical Exam     GENERAL: alert and no distress  Pt very tangential -   EYES: Eyes grossly normal to inspection.  No discharge or erythema, or obvious scleral/conjunctival abnormalities.  RESP: No audible wheeze, cough, or visible cyanosis.  No visible retractions or increased work of breathing.    SKIN: Visible skin clear. No significant rash, abnormal pigmentation or lesions.  NEURO: Cranial nerves grossly intact.  Mentation and speech appropriate for age.  PSYCH: Mentation appears normal, affect normal/bright, judgement and insight intact, normal speech and appearance well-groomed.              Assessment & Plan     Elevated sed rate  Pt has had two elevated sed rate  She is currently dealing with elevated blood pressure - see below  She denies alva joint issue, no new inflammatory issues   Will monitor for any new symptoms -   Plan to check labs again in early February   - **CBC with platelets differential FUTURE 2mo; Future  - CRP, inflammation; Future  - **ESR FUTURE anytime; Future    Hypertension, unspecified type  HTN - too high but patient declines any medication due to worry about medication side effects.  Did write for norvasc but pt didn't start  She is working on diet and supplements  Discussed looking into other options for meds - concern about stroke or MI  - **CBC with platelets differential FUTURE 2mo; Future  - CRP, inflammation; Future  -  "**ESR FUTURE anytime; Future    Dizziness  Improves at times - unclear if related to above  No treatment needed at this time  Did wonder about elevated sed rate and possibly PMR or Temporal arteritis ?  Will monitor for any new symptoms and check labs again but currently asymptomatic   - **CBC with platelets differential FUTURE 2mo; Future  - CRP, inflammation; Future  - **ESR FUTURE anytime; Future    Cat scratch  Wondered if elevated sed rate was related to this - has resolved.  - **CBC with platelets differential FUTURE 2mo; Future  - CRP, inflammation; Future  - **ESR FUTURE anytime; Future     Tobacco Cessation:   reports that she has been smoking cigarettes. She started smoking about 32 years ago. She has a 0.50 pack-year smoking history. She has never used smokeless tobacco.        BMI:   Estimated body mass index is 34.19 kg/m  as calculated from the following:    Height as of 11/20/20: 1.645 m (5' 4.75\").    Weight as of 11/20/20: 92.5 kg (203 lb 14.4 oz).                No follow-ups on file.    Kristi Smith DO  [facility]      Video-Visit Details    Type of service:  Video Visit    Video End Time:3:35pm    Originating Location (pt. Location): Home    Distant Location (provider location):  [facility]     Platform used for Video Visit: Katy          "

## 2021-01-08 ENCOUNTER — TELEPHONE (OUTPATIENT)
Dept: FAMILY MEDICINE | Facility: CLINIC | Age: 71
End: 2021-01-08
Payer: MEDICARE

## 2021-01-15 ENCOUNTER — HEALTH MAINTENANCE LETTER (OUTPATIENT)
Age: 71
End: 2021-01-15

## 2021-01-26 NOTE — TELEPHONE ENCOUNTER
Dr. Smith patient is due for Mammogram, PHQ9, and ACT has no upcoming appointment with you would you like me to send information reminders and questionnaires to complete or would you like to see patient has no upcoming appointments.

## 2021-03-01 DIAGNOSIS — Z11.59 ENCOUNTER FOR SCREENING FOR OTHER VIRAL DISEASES: ICD-10-CM

## 2021-03-18 ENCOUNTER — HOSPITAL ENCOUNTER (OUTPATIENT)
Facility: CLINIC | Age: 71
Discharge: HOME | End: 2021-03-18
Attending: SPECIALIST | Admitting: SPECIALIST
Payer: MEDICARE

## 2021-03-18 VITALS
HEART RATE: 60 BPM | DIASTOLIC BLOOD PRESSURE: 107 MMHG | SYSTOLIC BLOOD PRESSURE: 187 MMHG | OXYGEN SATURATION: 100 % | RESPIRATION RATE: 22 BRPM

## 2021-03-18 LAB — COLONOSCOPY: NORMAL

## 2021-03-18 PROCEDURE — 250N000011 HC RX IP 250 OP 636: Performed by: SPECIALIST

## 2021-03-18 PROCEDURE — G0500 MOD SEDAT ENDO SERVICE >5YRS: HCPCS | Performed by: SPECIALIST

## 2021-03-18 PROCEDURE — 45381 COLONOSCOPY SUBMUCOUS NJX: CPT

## 2021-03-18 PROCEDURE — 99153 MOD SED SAME PHYS/QHP EA: CPT | Performed by: SPECIALIST

## 2021-03-18 PROCEDURE — 88305 TISSUE EXAM BY PATHOLOGIST: CPT | Mod: 26 | Performed by: PATHOLOGY

## 2021-03-18 PROCEDURE — 45385 COLONOSCOPY W/LESION REMOVAL: CPT | Performed by: SPECIALIST

## 2021-03-18 PROCEDURE — 88305 TISSUE EXAM BY PATHOLOGIST: CPT | Mod: TC | Performed by: SPECIALIST

## 2021-03-18 RX ORDER — FENTANYL CITRATE 50 UG/ML
INJECTION, SOLUTION INTRAMUSCULAR; INTRAVENOUS PRN
Status: COMPLETED | OUTPATIENT
Start: 2021-03-18 | End: 2021-03-18

## 2021-03-18 RX ORDER — LIDOCAINE 40 MG/G
CREAM TOPICAL
Status: DISCONTINUED | OUTPATIENT
Start: 2021-03-18 | End: 2021-03-18 | Stop reason: HOSPADM

## 2021-03-18 RX ORDER — ONDANSETRON 2 MG/ML
4 INJECTION INTRAMUSCULAR; INTRAVENOUS
Status: DISCONTINUED | OUTPATIENT
Start: 2021-03-18 | End: 2021-03-18 | Stop reason: HOSPADM

## 2021-03-18 RX ADMIN — MIDAZOLAM 1 MG: 1 INJECTION INTRAMUSCULAR; INTRAVENOUS at 07:45

## 2021-03-18 RX ADMIN — MIDAZOLAM 0.25 MG: 1 INJECTION INTRAMUSCULAR; INTRAVENOUS at 08:00

## 2021-03-18 RX ADMIN — FENTANYL CITRATE 25 MCG: 50 INJECTION, SOLUTION INTRAMUSCULAR; INTRAVENOUS at 08:09

## 2021-03-18 RX ADMIN — FENTANYL CITRATE 25 MCG: 50 INJECTION, SOLUTION INTRAMUSCULAR; INTRAVENOUS at 07:46

## 2021-03-18 RX ADMIN — FENTANYL CITRATE 50 MCG: 50 INJECTION, SOLUTION INTRAMUSCULAR; INTRAVENOUS at 07:42

## 2021-03-18 RX ADMIN — MIDAZOLAM 0.5 MG: 1 INJECTION INTRAMUSCULAR; INTRAVENOUS at 07:45

## 2021-03-18 NOTE — H&P
Pre-Endoscopy History and Physical     Luz Krueger MRN# [id number]   YOB: 1950 Age: 70 year old     Date of Procedure: 3/18/2021  Primary care provider: Kristi Smith  Type of Endoscopy: Colonoscopy with possible biopsy, possible polypectomy  Reason for Procedure: history of polyps   Type of Anesthesia Anticipated: Conscious Sedation    HPI:    Luz is a 70 year old female who will be undergoing the above procedure.      A history and physical has been performed. The patient's medications and allergies have been reviewed. The risks and benefits of the procedure and the sedation options and risks were discussed with the patient.  All questions were answered and informed consent was obtained.      She denies a personal or family history of anesthesia complications or bleeding disorders.     Patient Active Problem List   Diagnosis     Genital herpes     Mild persistent asthma     Obstructive sleep apnea     Abnormal blood chemistry     Obesity     Dysthymia     Fatty liver     HYPERLIPIDEMIA LDL GOAL <130     Hypertension goal BP (blood pressure) < 130/80     Essential and other specified forms of tremor     Fear of travel with panic attacks     Diverticulitis of colon     Irritable bowel syndrome with diarrhea        Past Medical History:   Diagnosis Date     Allergic rhinitis, cause unspecified 09/1988     Asthma      Cancer (H) 1990/1    Basal Cell Carcinoma-removed no reoccurance     Depressive disorder 1991    resolving; psychotherapy 1-3x/mo.     Depressive disorder, not elsewhere classified 02/1990     Genital herpes, unspecified 01/1987     Hypertension goal BP (blood pressure) < 130/80      Irritable bowel syndrome 07/1990     Mild persistent asthma      Other functional disorder of bladder      Unspecified asthma(493.90)      Unspecified hypothyroidism      Unspecified sinusitis (chronic) 1997     Unspecified urinary incontinence         Past Surgical History:   Procedure Laterality Date      CHOLECYSTECTOMY       COLONOSCOPY  various    beign Polpys     COSMETIC SURGERY  during eye lid removal     EYE SURGERY      Dr. Park thinks its time 4 cataract surgery     ORTHOPEDIC SURGERY  1963    Removal of excess bone right foot     ZZC NONSPECIFIC PROCEDURE  1990    s/p Cholecystectomy     Z NONSPECIFIC PROCEDURE   and     Root Canals       Social History     Tobacco Use     Smoking status: Light Tobacco Smoker     Packs/day: 0.50     Years: 1.00     Pack years: 0.50     Types: Cigarettes     Start date: 1988     Last attempt to quit: 1993     Years since quittin.8     Smokeless tobacco: Never Used     Tobacco comment: I gain weight   Substance Use Topics     Alcohol use: Not Currently     Alcohol/week: 0.0 standard drinks     Comment: if go to a wedding, i might have some       Family History   Adopted: Yes   Problem Relation Age of Onset     Cancer Mother         Lung Cancer     Cancer - colorectal Maternal Grandmother         age 56     Colon Cancer Maternal Grandmother         told this by adoption agency     Aneurysm Maternal Uncle      Unknown/Adopted No family hx of      Depression No family hx of         told none via adoption agency       Prior to Admission medications    Medication Sig Start Date End Date Taking? Authorizing Provider   acyclovir (ZOVIRAX) 400 MG tablet Take 5 tablets by mouth every 24 hours 10/28/10   Reported, Patient   acyclovir (ZOVIRAX) 400 MG tablet Take 1 tablet (400 mg) by mouth every 8 hours for 5 days (take at onset of symptoms) 4/28/20 5/3/20  Sabine Trivedi MD   amLODIPine (NORVASC) 2.5 MG tablet Take 1 tablet (2.5 mg) by mouth daily 20   Kristi Smith, DO   Q-10 CO-ENZYME PO 2 TABLETS DAILY    Reported, Patient       Allergies   Allergen Reactions     Contrast Dye      Sulfa Drugs Hives        REVIEW OF SYSTEMS:   5 point ROS negative except as noted above in HPI, including Gen., Resp., CV, GI &  system  "review.    PHYSICAL EXAM:   There were no vitals taken for this visit. Estimated body mass index is 34.19 kg/m  as calculated from the following:    Height as of 11/20/20: 1.645 m (5' 4.75\").    Weight as of 11/20/20: 92.5 kg (203 lb 14.4 oz).   GENERAL APPEARANCE: alert, and oriented  MENTAL STATUS: alert  AIRWAY EXAM: Mallampatti Class III (visualization of the soft palate and base of uvula)  RESP: lungs clear to auscultation - no rales, rhonchi or wheezes  CV: regular rates and rhythm  DIAGNOSTICS:    Not indicated    IMPRESSION   ASA Class 2 - Mild systemic disease    PLAN:   Plan for Colonoscopy with possible biopsy, possible polypectomy. We discussed the risks, benefits and alternatives and the patient wished to proceed.    The above has been forwarded to the consulting provider.      Signed Electronically by: Denys Mcneill MD  March 18, 2021          "

## 2021-03-19 LAB — COPATH REPORT: NORMAL

## 2021-03-23 NOTE — RESULT ENCOUNTER NOTE
Assessment: Adenomatous polyps are benign, but have malignant potential. This increases the risk of colon cancer and impacts the frequency of colonoscopy. Based on the updated polypectomy surveillance recommendations, individuals with 3-4 adenomas <10 mm should occur surveillance in 3-5 years, and individuals with an adenoma >10 mm should undergo surveillance in 3 years. Because the one polyp was removed in a piecemeal fashion, a one year surveillance is recommended.     Recommendations: Surveillance colonoscopy in one years (2022).  First degree relatives of patients with adenomatous polyps are at increased risk of developing adenomas and colorectal cancer; screening colonoscopy is recommended.

## 2021-04-05 ENCOUNTER — DOCUMENTATION ONLY (OUTPATIENT)
Dept: OTHER | Facility: CLINIC | Age: 71
End: 2021-04-05

## 2021-05-05 ENCOUNTER — MYC MEDICAL ADVICE (OUTPATIENT)
Dept: FAMILY MEDICINE | Facility: CLINIC | Age: 71
End: 2021-05-05

## 2021-05-24 NOTE — TELEPHONE ENCOUNTER
Patient Quality Outreach      Summary:    Patient has the following on her problem list/HM:   Asthma review       ACT Total Scores 5/24/2021   ACT TOTAL SCORE -   ASTHMA ER VISITS -   ASTHMA HOSPITALIZATIONS -   ACT TOTAL SCORE (Goal Greater than or Equal to 20) 25   In the past 12 months, how many times did you visit the emergency room for your asthma without being admitted to the hospital? 0   In the past 12 months, how many times were you hospitalized overnight because of your asthma? 0          Patient is due/failing the following:   ACT needed and Breast Cancer Screening - Mammogram    Type of outreach:    Sent [employer] message.    Questions for provider review:    Updated ACT scored 25   Patient has declined to complete mammo   Message from pt     The total of questions 1-5 is 25; 5 for each; I've had zero visits and do not take an inhaler. I use cinnamon water and eucalyutcus (sp?)Cream on my chest for overnight when there is any slight wheezing and it goes away.  I do not take mammograms anymore; they hurt and the one lady who did a good job has retired. I'm 71 and on medicare.  Thank you for asking though. Luz Goncalves MA

## 2021-05-25 ASSESSMENT — ASTHMA QUESTIONNAIRES: ACT_TOTALSCORE: 25

## 2021-06-17 ENCOUNTER — MYC MEDICAL ADVICE (OUTPATIENT)
Dept: FAMILY MEDICINE | Facility: CLINIC | Age: 71
End: 2021-06-17

## 2021-06-17 DIAGNOSIS — R79.89 LOW VITAMIN D LEVEL: ICD-10-CM

## 2021-06-17 DIAGNOSIS — E78.5 HYPERLIPIDEMIA LDL GOAL <130: ICD-10-CM

## 2021-06-17 DIAGNOSIS — K76.0 FATTY LIVER: ICD-10-CM

## 2021-06-17 DIAGNOSIS — I10 HYPERTENSION GOAL BP (BLOOD PRESSURE) < 130/80: ICD-10-CM

## 2021-06-17 DIAGNOSIS — E66.09 CLASS 1 OBESITY DUE TO EXCESS CALORIES IN ADULT, UNSPECIFIED BMI, UNSPECIFIED WHETHER SERIOUS COMORBIDITY PRESENT: ICD-10-CM

## 2021-06-17 DIAGNOSIS — R79.9 ABNORMAL BLOOD CHEMISTRY: Primary | ICD-10-CM

## 2021-06-17 DIAGNOSIS — E66.811 CLASS 1 OBESITY DUE TO EXCESS CALORIES IN ADULT, UNSPECIFIED BMI, UNSPECIFIED WHETHER SERIOUS COMORBIDITY PRESENT: ICD-10-CM

## 2021-06-17 NOTE — TELEPHONE ENCOUNTER
PN,  Please see below MyChart message and advise.  D3 and hsCRP not future orders  CRP and ESR futured  Thanks,  Dora CARDENAS RN

## 2021-06-24 DIAGNOSIS — E66.09 CLASS 1 OBESITY DUE TO EXCESS CALORIES IN ADULT, UNSPECIFIED BMI, UNSPECIFIED WHETHER SERIOUS COMORBIDITY PRESENT: ICD-10-CM

## 2021-06-24 DIAGNOSIS — E78.5 HYPERLIPIDEMIA LDL GOAL <130: ICD-10-CM

## 2021-06-24 DIAGNOSIS — E66.811 CLASS 1 OBESITY DUE TO EXCESS CALORIES IN ADULT, UNSPECIFIED BMI, UNSPECIFIED WHETHER SERIOUS COMORBIDITY PRESENT: ICD-10-CM

## 2021-06-24 DIAGNOSIS — I10 HYPERTENSION GOAL BP (BLOOD PRESSURE) < 130/80: ICD-10-CM

## 2021-06-24 LAB — CRP SERPL HS-MCNC: 1.7 MG/L

## 2021-06-24 PROCEDURE — 82306 VITAMIN D 25 HYDROXY: CPT | Performed by: FAMILY MEDICINE

## 2021-06-24 PROCEDURE — 36415 COLL VENOUS BLD VENIPUNCTURE: CPT | Performed by: FAMILY MEDICINE

## 2021-06-24 PROCEDURE — 86141 C-REACTIVE PROTEIN HS: CPT | Performed by: FAMILY MEDICINE

## 2021-06-25 ENCOUNTER — MYC MEDICAL ADVICE (OUTPATIENT)
Dept: FAMILY MEDICINE | Facility: CLINIC | Age: 71
End: 2021-06-25

## 2021-06-25 LAB — DEPRECATED CALCIDIOL+CALCIFEROL SERPL-MC: 151 UG/L (ref 20–75)

## 2021-06-25 NOTE — RESULT ENCOUNTER NOTE
Dear Luz,   Your test results are all back -   Looks like you are getting too much vitamin D.  I would recommend STOPPING for now and plan to recheck again in 2 months.   Let us know if you have any questions.  -Kristi Smith, DO

## 2021-08-17 ENCOUNTER — MYC MEDICAL ADVICE (OUTPATIENT)
Dept: FAMILY MEDICINE | Facility: CLINIC | Age: 71
End: 2021-08-17

## 2021-08-18 NOTE — TELEPHONE ENCOUNTER
I am very confused by patients message     Here was her previous one?  I do not take mammograms anymore.  I do need to make an morning appointment for follow-up with Dr. Smith. To retest Vitamin D3 and Vitamin B12;   Also I would like to go on Lisopril, spelling maybe wrong; What I was on before for High Blood pressure. And could you check my history of my blood pressure when I was on it. It would be before 2015 when I went off of it in the Fall. The massive amounts of [place] just kept my blood pressure the same at 163/82-88. Unless that is okay that I didn't get into the 150's? If there is a delay in getting an appointment I would like to go on it now and then we can look at it when I come in.  Thank you in advance of this assistance for my health. I've been in crisis for 2 months and still am not seeing a clear way forward. I will be able to finally see Casie Mann, Psychologist, next Wed AM. There was a miscommunication and I couldn't get in to see her during this time.   Regards,  Luz Krueger    Might be good just to have Luz see me because my chart gets very confusing    Thanks  PN

## 2021-08-24 ASSESSMENT — ANXIETY QUESTIONNAIRES
1. FEELING NERVOUS, ANXIOUS, OR ON EDGE: SEVERAL DAYS
7. FEELING AFRAID AS IF SOMETHING AWFUL MIGHT HAPPEN: SEVERAL DAYS
8. IF YOU CHECKED OFF ANY PROBLEMS, HOW DIFFICULT HAVE THESE MADE IT FOR YOU TO DO YOUR WORK, TAKE CARE OF THINGS AT HOME, OR GET ALONG WITH OTHER PEOPLE?: SOMEWHAT DIFFICULT
4. TROUBLE RELAXING: SEVERAL DAYS
GAD7 TOTAL SCORE: 6
2. NOT BEING ABLE TO STOP OR CONTROL WORRYING: SEVERAL DAYS
6. BECOMING EASILY ANNOYED OR IRRITABLE: SEVERAL DAYS
3. WORRYING TOO MUCH ABOUT DIFFERENT THINGS: SEVERAL DAYS
7. FEELING AFRAID AS IF SOMETHING AWFUL MIGHT HAPPEN: SEVERAL DAYS
5. BEING SO RESTLESS THAT IT IS HARD TO SIT STILL: NOT AT ALL

## 2021-08-24 ASSESSMENT — PATIENT HEALTH QUESTIONNAIRE - PHQ9
10. IF YOU CHECKED OFF ANY PROBLEMS, HOW DIFFICULT HAVE THESE PROBLEMS MADE IT FOR YOU TO DO YOUR WORK, TAKE CARE OF THINGS AT HOME, OR GET ALONG WITH OTHER PEOPLE: SOMEWHAT DIFFICULT
SUM OF ALL RESPONSES TO PHQ QUESTIONS 1-9: 6
SUM OF ALL RESPONSES TO PHQ QUESTIONS 1-9: 6

## 2021-08-25 ENCOUNTER — OFFICE VISIT (OUTPATIENT)
Dept: FAMILY MEDICINE | Facility: CLINIC | Age: 71
End: 2021-08-25
Payer: MEDICARE

## 2021-08-25 VITALS
WEIGHT: 193.5 LBS | RESPIRATION RATE: 17 BRPM | SYSTOLIC BLOOD PRESSURE: 168 MMHG | TEMPERATURE: 97.4 F | HEIGHT: 65 IN | OXYGEN SATURATION: 97 % | BODY MASS INDEX: 32.24 KG/M2 | DIASTOLIC BLOOD PRESSURE: 94 MMHG | HEART RATE: 68 BPM

## 2021-08-25 DIAGNOSIS — E67.3 HIGH VITAMIN D LEVEL: ICD-10-CM

## 2021-08-25 DIAGNOSIS — R79.9 ABNORMAL BLOOD CHEMISTRY: ICD-10-CM

## 2021-08-25 DIAGNOSIS — R41.3 MEMORY CHANGES: ICD-10-CM

## 2021-08-25 DIAGNOSIS — I10 HYPERTENSION GOAL BP (BLOOD PRESSURE) < 130/80: Primary | ICD-10-CM

## 2021-08-25 PROBLEM — Z23 HIGH PRIORITY FOR 2019-NCOV VACCINE: Status: ACTIVE | Noted: 2021-08-25

## 2021-08-25 LAB
DEPRECATED CALCIDIOL+CALCIFEROL SERPL-MC: 97 UG/L (ref 20–75)
ERYTHROCYTE [SEDIMENTATION RATE] IN BLOOD BY WESTERGREN METHOD: 55 MM/HR (ref 0–30)
T3FREE SERPL-MCNC: 3.1 PG/ML (ref 2.3–4.2)
VIT B12 SERPL-MCNC: 825 PG/ML (ref 193–986)

## 2021-08-25 PROCEDURE — 36415 COLL VENOUS BLD VENIPUNCTURE: CPT | Performed by: FAMILY MEDICINE

## 2021-08-25 PROCEDURE — 80053 COMPREHEN METABOLIC PANEL: CPT | Performed by: FAMILY MEDICINE

## 2021-08-25 PROCEDURE — 82627 DEHYDROEPIANDROSTERONE: CPT | Performed by: FAMILY MEDICINE

## 2021-08-25 PROCEDURE — 99214 OFFICE O/P EST MOD 30 MIN: CPT | Performed by: FAMILY MEDICINE

## 2021-08-25 PROCEDURE — 84481 FREE ASSAY (FT-3): CPT | Performed by: FAMILY MEDICINE

## 2021-08-25 PROCEDURE — 84439 ASSAY OF FREE THYROXINE: CPT | Performed by: FAMILY MEDICINE

## 2021-08-25 PROCEDURE — 84443 ASSAY THYROID STIM HORMONE: CPT | Performed by: FAMILY MEDICINE

## 2021-08-25 PROCEDURE — 82306 VITAMIN D 25 HYDROXY: CPT | Performed by: FAMILY MEDICINE

## 2021-08-25 PROCEDURE — 82607 VITAMIN B-12: CPT | Performed by: FAMILY MEDICINE

## 2021-08-25 PROCEDURE — 85652 RBC SED RATE AUTOMATED: CPT | Performed by: FAMILY MEDICINE

## 2021-08-25 ASSESSMENT — PATIENT HEALTH QUESTIONNAIRE - PHQ9: SUM OF ALL RESPONSES TO PHQ QUESTIONS 1-9: 6

## 2021-08-25 ASSESSMENT — ANXIETY QUESTIONNAIRES: GAD7 TOTAL SCORE: 6

## 2021-08-25 ASSESSMENT — MIFFLIN-ST. JEOR: SCORE: 1388.59

## 2021-08-25 NOTE — PROGRESS NOTES
Assessment & Plan     Hypertension goal BP (blood pressure) < 130/80  Pt has long history of HTN -   Was on lisinopril in the past and tolerated  She stopped all her medications a few years ago and tried supplements - she continues to have high BP  Tried to emphasize risk for stroke and MI and potential kidney issues  Will check some labs  Did send Rx for lisinopril and will have her start (she is thinking about this)  F/u 4 weeks   - Comprehensive metabolic panel (BMP + Alb, Alk Phos, ALT, AST, Total. Bili, TP); Future  - Albumin Random Urine Quantitative with Creat Ratio; Future  - ESR: Erythrocyte sedimentation rate; Future  - DHEA sulfate; Future  - Comprehensive metabolic panel (BMP + Alb, Alk Phos, ALT, AST, Total. Bili, TP)  - ESR: Erythrocyte sedimentation rate  - DHEA sulfate  - Albumin Random Urine Quantitative with Creat Ratio; Future    Abnormal blood chemistry     - ESR: Erythrocyte sedimentation rate; Future  - ESR: Erythrocyte sedimentation rate  - Albumin Random Urine Quantitative with Creat Ratio; Future    Memory changes   concerns about anxiety and memory changes -   Struggling to remember things and more anxious  Suspect early cognitive impairment   Will refer for formal testing  Plan to check labs to look for underlying issues   - TSH; Future  - T3, Free; Future  - T4, free; Future  - Vitamin B12; Future  - ESR: Erythrocyte sedimentation rate; Future  - NEUROPSYCHOLOGY REFERRAL; Future  - TSH  - T3, Free  - T4, free  - Vitamin B12  - ESR: Erythrocyte sedimentation rate  - Albumin Random Urine Quantitative with Creat Ratio; Future    High vitamin D level     - Vitamin D Deficiency; Future  - ESR: Erythrocyte sedimentation rate; Future  - Vitamin D Deficiency  - ESR: Erythrocyte sedimentation rate  - Albumin Random Urine Quantitative with Creat Ratio; Future             Tobacco Cessation:   reports that she has been smoking cigarettes. She started smoking about 33 years ago. She has a 0.50  "pack-year smoking history. She has never used smokeless tobacco.      BMI:   Estimated body mass index is 32.2 kg/m  as calculated from the following:    Height as of this encounter: 1.651 m (5' 5\").    Weight as of this encounter: 87.8 kg (193 lb 8 oz).   Weight management plan: Discussed healthy diet and exercise guidelines        No follow-ups on file.    Kristi Smith DO  [facility]    Yovani Escobar is a 71 year old who presents for the following health issues     HPI     Answers for HPI/ROS submitted by the patient on 8/24/2021  If you checked off any problems, how difficult have these problems made it for you to do your work, take care of things at home, or get along with other people?: Somewhat difficult  PHQ9 TOTAL SCORE: 6  KANU 7 TOTAL SCORE: 6    Per patient would like to follow up labs for Vitamin D3, Vitamin B12, extensive thyroid.  Patient would also like discuss memory.     Went to an alternate practice providers   Was put on Staphsgria homopaste?   Was suppose to help with anxiety     BP- home readings 163/83  She had reached out about blood pressure -    The 10-year ASCVD risk score (Senagretchen QUINTANA Jr., et al., 2013) is: 35.1%    Values used to calculate the score:      Age: 71 years      Sex: Female      Is Non- : No      Diabetic: No      Tobacco smoker: Yes      Systolic Blood Pressure: 168 mmHg      Is BP treated: Yes      HDL Cholesterol: 93 mg/dL      Total Cholesterol: 197 mg/dL           Review of Systems   Constitutional, HEENT, cardiovascular, pulmonary, GI, , musculoskeletal, neuro, skin, endocrine and psych systems are negative, except as otherwise noted.      Objective    BP (!) 198/97   Pulse 68   Temp 97.4  F (36.3  C)   Ht 1.651 m (5' 5\")   Wt 87.8 kg (193 lb 8 oz)   SpO2 97%   BMI 32.20 kg/m    Body mass index is 32.2 kg/m .  Physical Exam   GENERAL: alert and no distress  RESP: lungs clear to auscultation - no rales, rhonchi or " wheezes  CV: regular rate and rhythm, normal S1 S2, no S3 or S4, no murmur, click or rub, no peripheral edema and peripheral pulses strong  PSYCH: mentation appears normal but very tangential and anxious

## 2021-08-26 LAB
ALBUMIN SERPL-MCNC: 3.8 G/DL (ref 3.4–5)
ALP SERPL-CCNC: 77 U/L (ref 40–150)
ALT SERPL W P-5'-P-CCNC: 24 U/L (ref 0–50)
ANION GAP SERPL CALCULATED.3IONS-SCNC: 4 MMOL/L (ref 3–14)
AST SERPL W P-5'-P-CCNC: 18 U/L (ref 0–45)
BILIRUB SERPL-MCNC: 0.3 MG/DL (ref 0.2–1.3)
BUN SERPL-MCNC: 7 MG/DL (ref 7–30)
CALCIUM SERPL-MCNC: 9 MG/DL (ref 8.5–10.1)
CHLORIDE BLD-SCNC: 106 MMOL/L (ref 94–109)
CO2 SERPL-SCNC: 29 MMOL/L (ref 20–32)
CREAT SERPL-MCNC: 0.64 MG/DL (ref 0.52–1.04)
DHEA-S SERPL-MCNC: 43 UG/DL (ref 35–430)
GFR SERPL CREATININE-BSD FRML MDRD: 89 ML/MIN/1.73M2
GLUCOSE BLD-MCNC: 93 MG/DL (ref 70–99)
POTASSIUM BLD-SCNC: 4 MMOL/L (ref 3.4–5.3)
PROT SERPL-MCNC: 7.6 G/DL (ref 6.8–8.8)
SODIUM SERPL-SCNC: 139 MMOL/L (ref 133–144)
T4 FREE SERPL-MCNC: 0.89 NG/DL (ref 0.76–1.46)
TSH SERPL DL<=0.005 MIU/L-ACNC: 3.39 MU/L (ref 0.4–4)

## 2021-08-27 ENCOUNTER — MYC MEDICAL ADVICE (OUTPATIENT)
Dept: FAMILY MEDICINE | Facility: CLINIC | Age: 71
End: 2021-08-27

## 2021-08-27 NOTE — RESULT ENCOUNTER NOTE
Dear Luz,   Your test results are all back -   -All of your labs are normal except  The vitamin D is still on the high side.   Your sed rate is still high but coming down.  Let us know if you have any questions.  -Kristi Smith, DO

## 2021-10-24 ENCOUNTER — HEALTH MAINTENANCE LETTER (OUTPATIENT)
Age: 71
End: 2021-10-24

## 2021-12-02 ENCOUNTER — MYC MEDICAL ADVICE (OUTPATIENT)
Dept: FAMILY MEDICINE | Facility: CLINIC | Age: 71
End: 2021-12-02
Payer: MEDICARE

## 2021-12-29 ENCOUNTER — OFFICE VISIT (OUTPATIENT)
Dept: NEUROPSYCHOLOGY | Facility: CLINIC | Age: 71
End: 2021-12-29
Attending: FAMILY MEDICINE
Payer: MEDICARE

## 2021-12-29 DIAGNOSIS — F39 MODERATE MOOD DISORDER (H): ICD-10-CM

## 2021-12-29 DIAGNOSIS — F41.9 ANXIETY DISORDER, UNSPECIFIED TYPE: ICD-10-CM

## 2021-12-29 DIAGNOSIS — R41.3 COMPLAINT OF MEMORY DISORDER WITHOUT OBSERVED OBJECTIVE MEMORY DEFICIT: Primary | ICD-10-CM

## 2021-12-29 PROCEDURE — 96133 NRPSYC TST EVAL PHYS/QHP EA: CPT | Performed by: CLINICAL NEUROPSYCHOLOGIST

## 2021-12-29 PROCEDURE — 96132 NRPSYC TST EVAL PHYS/QHP 1ST: CPT | Performed by: CLINICAL NEUROPSYCHOLOGIST

## 2021-12-29 PROCEDURE — 96139 PSYCL/NRPSYC TST TECH EA: CPT | Performed by: CLINICAL NEUROPSYCHOLOGIST

## 2021-12-29 PROCEDURE — 90791 PSYCH DIAGNOSTIC EVALUATION: CPT | Performed by: CLINICAL NEUROPSYCHOLOGIST

## 2021-12-29 PROCEDURE — 96138 PSYCL/NRPSYC TECH 1ST: CPT | Performed by: CLINICAL NEUROPSYCHOLOGIST
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
## 2021-12-30 NOTE — NURSING NOTE
The patient was seen for neuropsychological evaluation at the request of Kristi Smith DO for the purposes of diagnostic clarification and treatment planning.  229 minutes of test administration and scoring were provided by this writer.  Please see Dr. John Del Real's report for a full interpretation of the findings.    Susy Grissom  Psychometrist

## 2021-12-30 NOTE — PROGRESS NOTES
NAME  Luz Krueger    MRN  [id number]      49     AGE  71     SEX  Female     HANDEDNESS Right     EDUCATION 17     PIERRE  21     PROVIDER  PAULETTE     [facility]  AJ     STATION  OP            ORIENTATION      Time  0     Place      Personal Info.     Presidents             DCT       ERR: 3      U      .33      E-Score: 13             WRAT-4         SS %ile GE   Word Reading 104 61 >12.9          WAIS-IV         RDS: 7             Raw SS    Similarities 22 9    Block Design 47 14    Digit Span  20 8    Coding  45 9            COWAT   CFL   Raw 38      SS 11      %ile 60-71             ANIMAL FLUENCY      Raw 17      SS 9      T 45              BOSTON NAMING TEST     Raw 53 /60     SS 10      %ile 41-59             TRAIL MAKING TEST       Time Errors SSa %ile   A 30 0 12 72-81   B 71 0 12 72-81          LYNDSEY-O COMPLEX FIGURE       Raw T %ile   Time to Copy 279  >16   Copy  32  60-71           HVLT   1     Raw T    Trial 1  7     Trial 2  11     Trial 3  12     Learning  5     Total Recall 30 62    Delayed Recall 11 60    Percent Retention 92% 52    True Positives 12     False Positives 0     Disc. Index  12 59            BVMT   1     Raw T/%ile    Trial 1  2     Trial 2  6     Trial 3  7     Learning  5     Total Recall 15 39    Delayed Recall 5 37    Percent Retention 70% 6-10    Recognition Hits 6     Recognition F.P 0     Disc. Index  6 0           GDS       Raw 17      Interp. Mild/Moderate            KANU-7       Raw 11      Interp. Moderate             MMPI-3       Pt declined to finish
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
## 2022-02-13 ENCOUNTER — HEALTH MAINTENANCE LETTER (OUTPATIENT)
Age: 72
End: 2022-02-13

## 2022-03-21 DIAGNOSIS — I10 HYPERTENSION GOAL BP (BLOOD PRESSURE) < 130/80: ICD-10-CM

## 2022-03-23 DIAGNOSIS — I10 HYPERTENSION GOAL BP (BLOOD PRESSURE) < 130/80: ICD-10-CM

## 2022-03-23 RX ORDER — LISINOPRIL 10 MG/1
TABLET ORAL
Qty: 30 TABLET | Refills: 0 | Status: SHIPPED | OUTPATIENT
Start: 2022-03-23 | End: 2022-03-31

## 2022-03-23 NOTE — TELEPHONE ENCOUNTER
Medication is being filled for 1 time refill only due to:  Patient needs to be seen because due for blood pressure follow up and annual physical.

## 2022-03-25 RX ORDER — LISINOPRIL 10 MG/1
TABLET ORAL
Start: 2022-03-25

## 2022-03-30 ENCOUNTER — MYC MEDICAL ADVICE (OUTPATIENT)
Dept: FAMILY MEDICINE | Facility: CLINIC | Age: 72
End: 2022-03-30
Payer: MEDICARE

## 2022-03-30 DIAGNOSIS — I10 HYPERTENSION GOAL BP (BLOOD PRESSURE) < 130/80: ICD-10-CM

## 2022-03-30 DIAGNOSIS — Z12.11 SPECIAL SCREENING FOR MALIGNANT NEOPLASMS, COLON: Primary | ICD-10-CM

## 2022-03-31 NOTE — TELEPHONE ENCOUNTER
PN,  Please see below FIGS message and advise.  Routing to you - pt hasn't seen José Miguel recently   Message from 2/4/2022 pt referring to was in 1/25/2022 FIGS - unsure if got lost  Thanks,  Dora CARDENAS RN

## 2022-04-01 RX ORDER — LISINOPRIL 10 MG/1
10 TABLET ORAL DAILY
Qty: 90 TABLET | Refills: 1 | Status: SHIPPED | OUTPATIENT
Start: 2022-04-01

## 2022-04-04 ENCOUNTER — HOSPITAL ENCOUNTER (OUTPATIENT)
Facility: CLINIC | Age: 72
End: 2022-04-04
Attending: SPECIALIST | Admitting: SPECIALIST
Payer: MEDICARE

## 2022-04-04 ENCOUNTER — TELEPHONE (OUTPATIENT)
Dept: GASTROENTEROLOGY | Facility: CLINIC | Age: 72
End: 2022-04-04
Payer: MEDICARE

## 2022-04-04 NOTE — TELEPHONE ENCOUNTER
Screening Questions  BlueKIND OF PREP RedLOCATION [review exclusion criteria] GreenSEDATION TYPE  1. Have you had a positive covid test in the last 90 days? N     2. Are you active on mychart? Y    3. What insurance is in the chart? Medicare     3.   Ordering/Referring Provider: Luis    4. BMI 33.3 [BMI OVER 40-EXTENDED PREP]  If greater than 40 review exclusion criteria [PAC APPT IF @ [facility]]        5.  Respiratory Screening :  [If yes to any of the following HOSPITAL setting only]     Do you use daily home oxygen? N    Do you have mod to severe Obstructive Sleep Apnea? Yes-uses cpap  [OKAY @ [facility] SH PH RI]   Do you have Pulmonary Hypertension? N     Do you have UNCONTROLLED asthma? N        6.   Have you had a heart or lung transplant? N    7.   Are you currently on dialysis? N [ If yes, G-PREP & HOSPITAL setting only]     8.   Do you have chronic kidney disease? N [ If yes, G-PREP ]    9.   Have you had a stroke or Transient ischemic attack (TIA - aka  mini stroke ) within 6 months?  N (If yes, please review exclusion criteria)    10.   In the past 6 months, have you had any heart related issues including cardiomyopathy or heart attack? N           If yes, did it require cardiac stenting or other implantable device? N      11.   Do you have any implantable devices in your body (pacemaker, defib, LVAD)? N (If yes, please review exclusion criteria)    12.   Do you take nitroglycerin? N           If yes, how often? NA  (if yes, HOSPITAL setting ONLY)    13.   Are you currently taking any blood thinners? N           [IF YES, INFORM PATIENT TO FOLLOW UP W/ ORDERING PROVIDER FOR BRIDGING INSTRUCTIONS]     14.   Do you have a diagnosis of diabetes? N   [ If yes, G-PREP ]    15.   [FEMALES] Are you currently pregnant? NA    If yes, how many weeks? NA    16.   Are you taking any prescription pain medications on a routine schedule?  N  [ If yes, EXTENDED PREP.] [If yes, MAC]    17.   Do you have any chemical  dependencies such as alcohol, street drugs, or methadone?  N [If yes, MAC]    18.   Do you have any history of post-traumatic stress syndrome, severe anxiety or history of psychosis?  Yes, some, but had CS last year and it worked well for the patient  [If yes, MAC]    19.   Do you have a significant intellectual disability?  N [If yes, MAC]    20.   Do you transfer independently?  Yes    21.  On a regular basis do you go 3-5 days between bowel movements? N   [ If yes, EXTENDED PREP.]    22.   Preferred LOCAL Pharmacy for Pre Prescription      [id number] [place] [place] [facility] [address]        Scheduling Details      Caller : Luz  (Please ask for phone number if not scheduled by patient)    Type of Procedure Scheduled: Colon  Which Colonoscopy Prep was Sent?: Miralax-but patient has requested Clenpiq prep  [facility] PATIENTS & [facility] PATIENTS NEEDS EXTENDED PREP  Surgeon: Charito  Date of Procedure: 6/16/22  Location:       Sedation Type: CS  Conscious Sedation- Needs  for 6 hours after the procedure  MAC/General-Needs  for 24 hours after procedure    Pre-op Required at [facility] [place], [facility] and OR for MAC sedation: NA  (advise patient they will need a pre-op prior to procedure -)      Informed patient they will need an adult  Yes  Cannot take any type of public or medical transportation alone    [facility]Pro[facility] or Externally: Externally    Confirmed Nurse will call to complete assessment Yes    Additional comments: Patient requesting alternate prep.  Had a different prep last time in March 2021. Clenpiq was the prep the patient has last time, and she is requesting it again for this procedure

## 2022-06-03 ENCOUNTER — TELEPHONE (OUTPATIENT)
Dept: GASTROENTEROLOGY | Facility: CLINIC | Age: 72
End: 2022-06-03
Payer: MEDICARE

## 2022-06-03 NOTE — TELEPHONE ENCOUNTER
Caller: Tomer Pérez    Procedure: Colon    Date, Location, and Surgeon of Procedure Cancelled: 6-16/ [facility]/ [facility]/ [facility]    Ordering Provider:Kristi Smith,     Reason for cancel (please be detailed, any staff messages or encounters to note?):  is out        Rescheduled:  N- [facility]            08599 Exp Problem Focused - Mod. Complex

## 2022-06-09 ENCOUNTER — TELEPHONE (OUTPATIENT)
Dept: FAMILY MEDICINE | Facility: CLINIC | Age: 72
End: 2022-06-09
Payer: MEDICARE

## 2022-06-09 NOTE — TELEPHONE ENCOUNTER
Triage,   Luz called.   States her upcoming colonoscopy in June 2022 was canceled.   Was told they could reschedule for September 2022 but patient is worried that is too far out.   Requesting PNs recommendation for other GI providers so patient can be seen sooner for a colonoscopy.   Thanks!  Annika ROMERO

## 2022-06-10 NOTE — TELEPHONE ENCOUNTER
Called to discuss with pt   Can go anywhere  Was scheduled at [facility]   Gave pt # for [facility] [phone]  Gave pt # for [facility] too to call   Faxed referral to [phone]  Pt will callback if needed   Dora CARDENAS RN

## 2022-09-07 ENCOUNTER — HOSPITAL ENCOUNTER (OUTPATIENT)
Facility: CLINIC | Age: 72
Discharge: HOME | End: 2022-09-07
Attending: SPECIALIST | Admitting: SPECIALIST
Payer: MEDICARE

## 2022-09-07 VITALS
HEART RATE: 66 BPM | OXYGEN SATURATION: 100 % | SYSTOLIC BLOOD PRESSURE: 214 MMHG | RESPIRATION RATE: 26 BRPM | DIASTOLIC BLOOD PRESSURE: 115 MMHG

## 2022-09-07 PROCEDURE — 999N000002 HC CANCELLED SURGERY UP TO 16-30 MINS: Performed by: SPECIALIST

## 2022-09-07 PROCEDURE — 45378 DIAGNOSTIC COLONOSCOPY: CPT | Mod: 73 | Performed by: SPECIALIST

## 2022-09-07 RX ORDER — LIDOCAINE 40 MG/G
CREAM TOPICAL
Status: DISCONTINUED | OUTPATIENT
Start: 2022-09-07 | End: 2022-09-07 | Stop reason: HOSPADM

## 2022-09-07 RX ORDER — ONDANSETRON 2 MG/ML
4 INJECTION INTRAMUSCULAR; INTRAVENOUS
Status: DISCONTINUED | OUTPATIENT
Start: 2022-09-07 | End: 2022-09-07 | Stop reason: HOSPADM

## 2022-09-07 ASSESSMENT — ACTIVITIES OF DAILY LIVING (ADL): ADLS_ACUITY_SCORE: 35

## 2022-09-07 NOTE — H&P
Pre-Endoscopy History and Physical     Luz Krueger MRN# [id number]   YOB: 1950 Age: 72 year old     Date of Procedure: 9/7/2022  Primary care provider: Kristi Smith  Type of Endoscopy: colonoscopy  Reason for Procedure: history of polyps  Type of Anesthesia Anticipated: Moderate sedation    HPI:    Luz is a 72 year old female who was scheduled for the above procedure.      The nurse informed me her blood pressure was high. When I went into the room prior to the procedure, her BP increased to 215/114. She denied headache, chest pain, shortness of breath.      Allergies   Allergen Reactions     Contrast Dye      Sulfa Drugs Hives        Current Facility-Administered Medications   Medication     lidocaine (LMX4) cream     lidocaine 1 % 0.1-1 mL     ondansetron (ZOFRAN) injection 4 mg     sodium chloride (PF) 0.9% PF flush 3 mL     sodium chloride (PF) 0.9% PF flush 3 mL       Patient Active Problem List   Diagnosis     Genital herpes     Mild persistent asthma     Obstructive sleep apnea     Abnormal blood chemistry     Obesity     Dysthymia     Fatty liver     HYPERLIPIDEMIA LDL GOAL <130     Hypertension goal BP (blood pressure) < 130/80     Essential and other specified forms of tremor     Fear of travel with panic attacks     Diverticulitis of colon     Irritable bowel syndrome with diarrhea     High priority for 2019-nCoV vaccine        Past Medical History:   Diagnosis Date     Allergic rhinitis, cause unspecified 09/1988     Asthma      Cancer (H) 1990/1    Basal Cell Carcinoma-removed no reoccurance     Depressive disorder 1991    resolving; psychotherapy 1-3x/mo.     Depressive disorder, not elsewhere classified 02/1990     Genital herpes, unspecified 01/1987     Hypertension goal BP (blood pressure) < 130/80      Irritable bowel syndrome 07/1990     Mild persistent asthma      Other functional disorder of bladder      Sleep apnea      Unspecified asthma(493.90)      Unspecified  "hypothyroidism      Unspecified sinusitis (chronic) 1997     Unspecified urinary incontinence         Past Surgical History:   Procedure Laterality Date     CHOLECYSTECTOMY       COLONOSCOPY  various    beign Polpys     COSMETIC SURGERY  during eye lid removal     EYE SURGERY      Dr. Park thinks its time 4 cataract surgery     ORTHOPEDIC SURGERY  1963    Removal of excess bone right foot     ZZC NONSPECIFIC PROCEDURE  07/1990    s/p Cholecystectomy     ZZC NONSPECIFIC PROCEDURE  2000 and 2003    Root Canals       Social History     Tobacco Use     Smoking status: Light Tobacco Smoker     Packs/day: 0.50     Years: 1.00     Pack years: 0.50     Types: Cigarettes     Start date: 6/1/1988     Smokeless tobacco: Never Used     Tobacco comment: I gain weight   Substance Use Topics     Alcohol use: Not Currently     Alcohol/week: 0.0 standard drinks     Comment: if go to a wedding, i might have some       Family History   Adopted: Yes   Problem Relation Age of Onset     Cancer Mother         Lung Cancer     Cancer - colorectal Maternal Grandmother         age 56     Colon Cancer Maternal Grandmother         told this by adoption agency     Aneurysm Maternal Uncle      Unknown/Adopted No family hx of      Depression No family hx of         told none via adoption agency       REVIEW OF SYSTEMS:   5 point ROS negative except as noted above in HPI, including Gen., Resp., CV, GI &  system review.    PHYSICAL EXAM:   BP (!) 214/115   Pulse 66   Resp 26   SpO2 100%  Estimated body mass index is 32.2 kg/m  as calculated from the following:    Height as of 8/25/21: 1.651 m (5' 5\").    Weight as of 8/25/21: 87.8 kg (193 lb 8 oz).   GENERAL APPEARANCE: crying and over weight  MENTAL STATUS: alert      DIAGNOSTICS:    Not indicated    IMPRESSION   ASA Class 3 - Severe systemic disease, but not incapacitating. Uncontrolled blood pressure without signs or symptoms of a hypertensive crisis. I called Kristi Smith and discussed " my concerns.     After I informed Luz that I was reluctant to proceed, she had a panic attack. I believe the risk of proceeding with the colonoscopy (e.g. stroke) outweigh the potential benefit.    PLAN:   I cancelled today's colonoscopy.  Luz is to see her primary care provider in the next 24-48 hours.    The above has been forwarded to the consulting provider.      Signed Electronically by: Denys Mcneill MD  September 7, 2022

## 2022-09-07 NOTE — OR NURSING
Patient's blood pressure's are averaging 200/100 and Dr. Mcneill is notified of the extremely high blood pressures and the patient is having a panic attack.  The patient is coached to slow the breathing down and lavender essential oil is put on the bedside to help bring down the anxiety.  The patient is very upset that the procedure is canceled due to her blood pressure.  Dr. Mcneill talks with the patient to calm her down and encourages the patient to see her doctor regarding the blood pressures.  The patient states she doesn't want to see her doctor and wants a new one and wants to go home and smoke her cigarettes.  The patient is directed not to smoke due to the extremely high blood pressures.  The patient is discharged to home.

## 2022-10-15 ENCOUNTER — HEALTH MAINTENANCE LETTER (OUTPATIENT)
Age: 72
End: 2022-10-15

## 2022-12-28 ENCOUNTER — TELEPHONE (OUTPATIENT)
Dept: GASTROENTEROLOGY | Facility: CLINIC | Age: 72
End: 2022-12-28

## 2022-12-28 NOTE — TELEPHONE ENCOUNTER
Screening Questions    BlueKIND OF PREP RedLOCATION [review exclusion criteria] GreenSEDATION TYPE    N Have you had a positive covid test in the last 2 weeks?   If yes, what date? Schedule out 14 days from symptoms  Y Your able to give consent for your medical care?  Y Are you active on mychart?   MEDICARE [facility] What insurance do you carry?    EVELYN GARCIA Ordering/Referring Provider:     32.1 BMI [BMI OVER 40-EXTENDED PREP]  BMI OVER 40 NEED PAC EVALUATION FOR [facility]    Respiratory Screening  [If yes to any of the following HOSPITAL setting only]     N      Do you use daily home oxygen?   Y     Do you have mod to severe Obstructive Sleep Apnea?  N      Do you have Pulmonary Hypertension? NEED PAC APPT AT [facility]    N      Do you have UNCONTROLLED asthma?      N Have you had a heart or lung transplant?       N Are you currently on dialysis? [If yes, G-PREP & HOSPITAL setting only]   N  Do you have chronic kidney disease? [If yes, G-PREP]  N  Do you have a diagnosis of diabetes?[If yes, G-PREP]    N Have you had a stroke or Transient ischemic attack (TIA - aka  mini stroke ) within 6 months? (If yes, please review exclusion criteria)  N  In the past 6 months, have you had any heart related issues including cardiomyopathy or heart attack?   N  If yes, did it require cardiac stenting or other implantable device?       N Do you have any implantable devices in your body (pacemaker, defib, LVAD)? (If yes, please review exclusion criteria)    N Do you take nitroglycerin?   N If yes, how often?  (If yes, HOSPITAL setting ONLY)  N Are you currently taking any blood thinners?           [IF YES, INFORM PATIENT TO FOLLOW UP W/ ORDERING PROVIDER FOR BRIDGING INSTRUCTIONS]     N  Do you take Phentermine?   Yes-> Hold for 7 days before procedure.  Please consult your prescribing provider if you have questions about holding this  medication.         [FEMALES] are you currently pregnant?       If yes, how many weeks? [Greater than 12 weeks, OR NEEDED]    N Any prescription pain medications taken daily like narcotics? [EXTENDED PREP AND MAC]    N  Any chemical dependencies such as alcohol, street drugs, or methadone? [If yes, MAC]    N Any post-traumatic stress syndrome, severe anxiety or history of psychosis? [If yes, MAC]    N Do you need help transferring? (If NO, please HOSPITAL setting  only)     N  On a regular basis do you go 3-5 days without a bowel movement?[ If yes, EXTENDED PREP.]     Preferred LOCAL Pharmacy for Pre Prescription:         [id number] - [place], [place] - [address] AT [facility] [address]                          Scheduling Details    Luz Caller:   (Please ask for phone number if not scheduled by patient)    Type of Procedure Scheduled: Lower Endoscopy [Colonoscopy]      STANDARD [facility]-If you answer yes to questions #8, #20, #21 Which Colonoscopy Prep was Sent:   SUE CF PATIENTS & GROEN'S PATIENTS NEEDS EXTENDED PREP    .1/19 Date of Procedure:   [facility] Surgeon:   SUZY Location:      Sedation Type:     Conscious Sedation- Needs  for 6 hours after the procedure  MAC/General-Needs  for 24 hours after procedure    Y Informed patient they will need an adult          Cannot take any type of public or medical transportation alone    Y Confirmed Nurse will call to complete assessment      Pre-Procedure Covid test to be completed [ESSC PCR Testing Required]    DOUBLE CHECK BMI AND JAMIR  NEEDS ADULT -CANNOT TAKE PUBILC OR MEDICAL TRANSPOTATION  COVID TEST FOR [facility] 3-4 DAYS  COIVD TEST FOR [facility] CAN BE HOME     Additional comments:

## 2023-03-15 RX ORDER — BISACODYL 5 MG/1
TABLET, DELAYED RELEASE ORAL
Qty: 4 TABLET | Refills: 0 | Status: SHIPPED | OUTPATIENT
Start: 2023-03-15

## 2023-03-22 ENCOUNTER — HOSPITAL ENCOUNTER (OUTPATIENT)
Facility: CLINIC | Age: 73
Discharge: HOME | End: 2023-03-22
Attending: SPECIALIST | Admitting: SPECIALIST
Payer: MEDICARE

## 2023-03-22 VITALS
RESPIRATION RATE: 19 BRPM | BODY MASS INDEX: 33.32 KG/M2 | SYSTOLIC BLOOD PRESSURE: 184 MMHG | WEIGHT: 200 LBS | OXYGEN SATURATION: 98 % | HEIGHT: 65 IN | HEART RATE: 66 BPM | DIASTOLIC BLOOD PRESSURE: 74 MMHG

## 2023-03-22 DIAGNOSIS — Z12.11 ENCOUNTER FOR SCREENING COLONOSCOPY: Primary | ICD-10-CM

## 2023-03-22 LAB — COLONOSCOPY: NORMAL

## 2023-03-22 PROCEDURE — 45378 DIAGNOSTIC COLONOSCOPY: CPT | Performed by: SPECIALIST

## 2023-03-22 PROCEDURE — 99153 MOD SED SAME PHYS/QHP EA: CPT | Performed by: SPECIALIST

## 2023-03-22 PROCEDURE — G0500 MOD SEDAT ENDO SERVICE >5YRS: HCPCS | Performed by: SPECIALIST

## 2023-03-22 PROCEDURE — G0105 COLORECTAL SCRN; HI RISK IND: HCPCS | Performed by: SPECIALIST

## 2023-03-22 PROCEDURE — 250N000011 HC RX IP 250 OP 636: Performed by: SPECIALIST

## 2023-03-22 RX ORDER — FENTANYL CITRATE 50 UG/ML
INJECTION, SOLUTION INTRAMUSCULAR; INTRAVENOUS PRN
Status: DISCONTINUED | OUTPATIENT
Start: 2023-03-22 | End: 2023-03-22 | Stop reason: HOSPADM

## 2023-03-22 RX ORDER — ONDANSETRON 2 MG/ML
4 INJECTION INTRAMUSCULAR; INTRAVENOUS
Status: DISCONTINUED | OUTPATIENT
Start: 2023-03-22 | End: 2023-03-22 | Stop reason: HOSPADM

## 2023-03-22 RX ORDER — LIDOCAINE 40 MG/G
CREAM TOPICAL
Status: DISCONTINUED | OUTPATIENT
Start: 2023-03-22 | End: 2023-03-22 | Stop reason: HOSPADM

## 2023-03-22 ASSESSMENT — ACTIVITIES OF DAILY LIVING (ADL): ADLS_ACUITY_SCORE: 35

## 2023-03-22 NOTE — H&P
Pre-Endoscopy History and Physical     Luz Krueger MRN# [id number]   YOB: 1950 Age: 72 year old     Date of Procedure: 3/22/2023  Primary care provider: No Ref-Primary, Physician  Type of Endoscopy: colonoscopy  Reason for Procedure: surveillance of colon polyps  Type of Anesthesia Anticipated: Moderate sedation    HPI:    Luz is a 72 year old female who will be undergoing the above procedure.      A history and physical has been performed. The patient's medications and allergies have been reviewed. The risks and benefits of the procedure and the sedation options and risks were discussed with the patient.  All questions were answered and informed consent was obtained.      She denies a personal or family history of anesthesia complications or bleeding disorders.     Allergies   Allergen Reactions     Contrast Dye      Sulfa Drugs Hives     Flagyl [Metronidazole] Rash        Current Facility-Administered Medications   Medication     fentaNYL (PF) (SUBLIMAZE) injection     lidocaine (LMX4) cream     lidocaine 1 % 0.1-1 mL     midazolam (VERSED) injection     ondansetron (ZOFRAN) injection 4 mg     sodium chloride (PF) 0.9% PF flush 3 mL     sodium chloride (PF) 0.9% PF flush 3 mL       Patient Active Problem List   Diagnosis     Genital herpes     Mild persistent asthma     Obstructive sleep apnea     Abnormal blood chemistry     Obesity     Dysthymia     Fatty liver     HYPERLIPIDEMIA LDL GOAL <130     Hypertension goal BP (blood pressure) < 130/80     Essential and other specified forms of tremor     Fear of travel with panic attacks     Diverticulitis of colon     Irritable bowel syndrome with diarrhea     High priority for 2019-nCoV vaccine        Past Medical History:   Diagnosis Date     Allergic rhinitis, cause unspecified 09/1988     Asthma      Cancer (H) 1990/1    Basal Cell Carcinoma-removed no reoccurance     Depressive disorder 1991    resolving; psychotherapy 1-3x/mo.     Depressive  "disorder, not elsewhere classified 02/1990     Genital herpes, unspecified 01/1987     Hypertension goal BP (blood pressure) < 130/80      Irritable bowel syndrome 07/1990     Mild persistent asthma      Other functional disorder of bladder      Sleep apnea      Unspecified asthma(493.90)      Unspecified hypothyroidism      Unspecified sinusitis (chronic) 1997     Unspecified urinary incontinence         Past Surgical History:   Procedure Laterality Date     CHOLECYSTECTOMY       COLONOSCOPY  various    beign Polpys     COLONOSCOPY N/A 9/7/2022    Procedure: COLONOSCOPY;  Surgeon: Denys Mcneill MD;  Location:  GI     COSMETIC SURGERY  during eye lid removal     EYE SURGERY      Dr. Park thinks its time 4 cataract surgery     ORTHOPEDIC SURGERY  1963    Removal of excess bone right foot     ZZC NONSPECIFIC PROCEDURE  07/1990    s/p Cholecystectomy     ZZC NONSPECIFIC PROCEDURE  2000 and 2003    Root Canals       Social History     Tobacco Use     Smoking status: Light Smoker     Packs/day: 0.50     Years: 1.00     Pack years: 0.50     Types: Cigarettes     Start date: 6/1/1988     Smokeless tobacco: Never     Tobacco comments:     I gain weight   Substance Use Topics     Alcohol use: Not Currently     Alcohol/week: 0.0 standard drinks     Comment: if go to a wedding, i might have some       Family History   Adopted: Yes   Problem Relation Age of Onset     Cancer Mother         Lung Cancer     Cancer - colorectal Maternal Grandmother         age 56     Colon Cancer Maternal Grandmother         told this by adoption agency     Aneurysm Maternal Uncle      Unknown/Adopted No family hx of      Depression No family hx of         told none via adoption agency       REVIEW OF SYSTEMS:   5 point ROS negative except as noted above in HPI, including Gen., Resp., CV, GI &  system review.    PHYSICAL EXAM:   BP (!) 192/98   Pulse 62   Resp 16   Ht 1.651 m (5' 5\")   Wt 90.7 kg (200 lb)   SpO2 98%   BMI 33.28 " "kg/m   Estimated body mass index is 33.28 kg/m  as calculated from the following:    Height as of this encounter: 1.651 m (5' 5\").    Weight as of this encounter: 90.7 kg (200 lb).   GENERAL APPEARANCE: healthy and over weight  MENTAL STATUS: alert  AIRWAY EXAM: Mallampatti Class II (visualization of the soft palate, fauces, and uvula)  RESP: lungs clear to auscultation - no rales, rhonchi or wheezes  CV: regular rates and rhythm, normal S1 S2, no S3 or S4 and no murmur, click or rub    DIAGNOSTICS:    Not indicated    IMPRESSION   ASA Class 2 - Mild systemic disease    PLAN:   Colonoscopy    The above has been forwarded to the consulting provider.      Signed Electronically by: Denys Mcneill MD  March 22, 2023        "

## 2023-10-29 ENCOUNTER — HEALTH MAINTENANCE LETTER (OUTPATIENT)
Age: 73
End: 2023-10-29

## 2024-12-21 ENCOUNTER — HEALTH MAINTENANCE LETTER (OUTPATIENT)
Age: 74
End: 2024-12-21

## (undated) RX ORDER — FENTANYL CITRATE 50 UG/ML
INJECTION, SOLUTION INTRAMUSCULAR; INTRAVENOUS
Status: DISPENSED
Start: 2022-09-07

## (undated) RX ORDER — FENTANYL CITRATE 50 UG/ML
INJECTION, SOLUTION INTRAMUSCULAR; INTRAVENOUS
Status: DISPENSED
Start: 2021-03-18

## (undated) RX ORDER — FENTANYL CITRATE 50 UG/ML
INJECTION, SOLUTION INTRAMUSCULAR; INTRAVENOUS
Status: DISPENSED
Start: 2023-03-22